# Patient Record
Sex: FEMALE | Race: WHITE | Employment: UNEMPLOYED | URBAN - NONMETROPOLITAN AREA
[De-identification: names, ages, dates, MRNs, and addresses within clinical notes are randomized per-mention and may not be internally consistent; named-entity substitution may affect disease eponyms.]

---

## 2017-02-27 ENCOUNTER — HOSPITAL ENCOUNTER (OUTPATIENT)
Dept: PAIN MANAGEMENT | Age: 42
Discharge: HOME | End: 2017-02-27
Payer: COMMERCIAL

## 2017-02-27 VITALS
SYSTOLIC BLOOD PRESSURE: 128 MMHG | DIASTOLIC BLOOD PRESSURE: 79 MMHG | BODY MASS INDEX: 28.32 KG/M2 | HEIGHT: 65 IN | WEIGHT: 170 LBS | RESPIRATION RATE: 16 BRPM | OXYGEN SATURATION: 95 % | TEMPERATURE: 97.4 F | HEART RATE: 86 BPM

## 2017-02-27 DIAGNOSIS — M54.16 LUMBAR RADICULOPATHY: Primary | ICD-10-CM

## 2017-02-27 DIAGNOSIS — M54.16 LUMBAR BACK PAIN WITH RADICULOPATHY AFFECTING LEFT LOWER EXTREMITY: ICD-10-CM

## 2017-02-27 PROCEDURE — G0463 HOSPITAL OUTPT CLINIC VISIT: HCPCS

## 2017-02-27 RX ORDER — MESALAMINE 375 MG/1
4 CAPSULE, EXTENDED RELEASE ORAL DAILY
Refills: 11 | COMMUNITY
Start: 2017-02-03 | End: 2017-07-26

## 2017-02-27 RX ORDER — SERTRALINE HYDROCHLORIDE 100 MG/1
150 TABLET, FILM COATED ORAL DAILY
Refills: 2 | COMMUNITY
Start: 2017-02-01

## 2017-02-27 RX ORDER — QUETIAPINE FUMARATE 50 MG/1
1 TABLET, FILM COATED ORAL NIGHTLY
Refills: 2 | COMMUNITY
Start: 2017-02-01

## 2017-02-27 RX ORDER — HYDROCHLOROTHIAZIDE 12.5 MG/1
1 TABLET ORAL DAILY
Refills: 2 | COMMUNITY
Start: 2017-02-01 | End: 2021-03-04

## 2017-02-27 RX ORDER — INSULIN GLARGINE 100 [IU]/ML
40 INJECTION, SOLUTION SUBCUTANEOUS NIGHTLY
Refills: 2 | COMMUNITY
Start: 2016-12-31 | End: 2017-04-20 | Stop reason: CLARIF

## 2017-02-27 RX ORDER — TRAMADOL HYDROCHLORIDE 50 MG/1
50 TABLET ORAL 3 TIMES DAILY PRN
Qty: 90 TABLET | Refills: 1 | Status: SHIPPED | OUTPATIENT
Start: 2017-02-27 | End: 2017-04-20 | Stop reason: SDUPTHER

## 2017-02-27 RX ORDER — GABAPENTIN 300 MG/1
1 CAPSULE ORAL 4 TIMES DAILY
Refills: 2 | COMMUNITY
Start: 2017-01-05

## 2017-02-27 RX ORDER — OLANZAPINE 5 MG/1
1 TABLET ORAL DAILY
Refills: 2 | COMMUNITY
Start: 2017-01-05

## 2017-02-27 RX ORDER — MONTELUKAST SODIUM 4 MG/1
2-3 TABLET, CHEWABLE ORAL NIGHTLY
Refills: 11 | COMMUNITY
Start: 2017-02-22 | End: 2017-07-26

## 2017-02-27 RX ORDER — LAMOTRIGINE 100 MG/1
100 TABLET ORAL DAILY
COMMUNITY

## 2017-02-27 RX ORDER — INSULIN ASPART 100 [IU]/ML
20 INJECTION, SOLUTION INTRAVENOUS; SUBCUTANEOUS 3 TIMES DAILY
Refills: 2 | COMMUNITY
Start: 2017-01-31

## 2017-02-27 ASSESSMENT — PAIN DESCRIPTION - LOCATION: LOCATION: BACK;LEG

## 2017-02-27 ASSESSMENT — PAIN DESCRIPTION - FREQUENCY: FREQUENCY: CONTINUOUS

## 2017-02-27 ASSESSMENT — PAIN DESCRIPTION - ONSET: ONSET: ON-GOING

## 2017-02-27 ASSESSMENT — PAIN DESCRIPTION - PAIN TYPE: TYPE: CHRONIC PAIN

## 2017-02-27 ASSESSMENT — ACTIVITIES OF DAILY LIVING (ADL): EFFECT OF PAIN ON DAILY ACTIVITIES: DAILY ACTIVITIES

## 2017-02-27 ASSESSMENT — PAIN DESCRIPTION - DESCRIPTORS: DESCRIPTORS: ACHING;CONSTANT;THROBBING;NUMBNESS;TINGLING

## 2017-02-27 ASSESSMENT — PAIN DESCRIPTION - ORIENTATION: ORIENTATION: LEFT;LOWER

## 2017-02-27 ASSESSMENT — PAIN SCALES - GENERAL: PAINLEVEL_OUTOF10: 7

## 2017-04-20 ENCOUNTER — HOSPITAL ENCOUNTER (OUTPATIENT)
Dept: PAIN MANAGEMENT | Age: 42
Discharge: HOME | End: 2017-04-20
Payer: COMMERCIAL

## 2017-04-20 VITALS
SYSTOLIC BLOOD PRESSURE: 114 MMHG | OXYGEN SATURATION: 92 % | BODY MASS INDEX: 27.12 KG/M2 | HEART RATE: 85 BPM | DIASTOLIC BLOOD PRESSURE: 71 MMHG | WEIGHT: 163 LBS | RESPIRATION RATE: 20 BRPM

## 2017-04-20 VITALS — HEART RATE: 79 BPM | OXYGEN SATURATION: 94 % | SYSTOLIC BLOOD PRESSURE: 120 MMHG | DIASTOLIC BLOOD PRESSURE: 79 MMHG

## 2017-04-20 DIAGNOSIS — M51.36 LUMBAR DEGENERATIVE DISC DISEASE: ICD-10-CM

## 2017-04-20 DIAGNOSIS — M51.16 LUMBAR DISC DISEASE WITH RADICULOPATHY: Chronic | ICD-10-CM

## 2017-04-20 PROCEDURE — 3209999900 FLUORO FOR SURGICAL PROCEDURES

## 2017-04-20 PROCEDURE — 2500000003 HC RX 250 WO HCPCS

## 2017-04-20 PROCEDURE — 62323 NJX INTERLAMINAR LMBR/SAC: CPT

## 2017-04-20 PROCEDURE — 6360000002 HC RX W HCPCS

## 2017-04-20 PROCEDURE — 2580000003 HC RX 258

## 2017-04-20 PROCEDURE — 80307 DRUG TEST PRSMV CHEM ANLYZR: CPT

## 2017-04-20 RX ORDER — TRAMADOL HYDROCHLORIDE 50 MG/1
TABLET ORAL
Qty: 45 TABLET | Refills: 1 | Status: SHIPPED | OUTPATIENT
Start: 2017-04-20 | End: 2017-06-14 | Stop reason: SDUPTHER

## 2017-04-20 RX ORDER — CLINDAMYCIN HYDROCHLORIDE 300 MG/1
300 CAPSULE ORAL 3 TIMES DAILY
COMMUNITY
Start: 2017-04-13 | End: 2018-02-12 | Stop reason: ALTCHOICE

## 2017-04-20 RX ORDER — 0.9 % SODIUM CHLORIDE 0.9 %
VIAL (ML) INJECTION
Status: COMPLETED | OUTPATIENT
Start: 2017-04-20 | End: 2017-04-20

## 2017-04-20 RX ORDER — LIDOCAINE HYDROCHLORIDE 10 MG/ML
INJECTION, SOLUTION EPIDURAL; INFILTRATION; INTRACAUDAL; PERINEURAL
Status: COMPLETED | OUTPATIENT
Start: 2017-04-20 | End: 2017-04-20

## 2017-04-20 RX ORDER — TRAMADOL HYDROCHLORIDE 50 MG/1
50 TABLET ORAL 3 TIMES DAILY PRN
Qty: 90 TABLET | Refills: 1 | Status: SHIPPED | OUTPATIENT
Start: 2017-04-28 | End: 2017-04-20 | Stop reason: SDUPTHER

## 2017-04-20 RX ORDER — BUPIVACAINE HYDROCHLORIDE 2.5 MG/ML
INJECTION, SOLUTION EPIDURAL; INFILTRATION; INTRACAUDAL
Status: COMPLETED | OUTPATIENT
Start: 2017-04-20 | End: 2017-04-20

## 2017-04-20 RX ORDER — ERGOCALCIFEROL 1.25 MG/1
50000 CAPSULE ORAL WEEKLY
COMMUNITY

## 2017-04-20 RX ORDER — QUETIAPINE FUMARATE 100 MG/1
100 TABLET, FILM COATED ORAL EVERY EVENING
Refills: 5 | COMMUNITY
Start: 2017-04-03

## 2017-04-20 RX ORDER — METHYLPREDNISOLONE ACETATE 80 MG/ML
INJECTION, SUSPENSION INTRA-ARTICULAR; INTRALESIONAL; INTRAMUSCULAR; SOFT TISSUE
Status: COMPLETED | OUTPATIENT
Start: 2017-04-20 | End: 2017-04-20

## 2017-04-20 RX ADMIN — BUPIVACAINE HYDROCHLORIDE 2.5 ML: 2.5 INJECTION, SOLUTION EPIDURAL; INFILTRATION; INTRACAUDAL at 14:01

## 2017-04-20 RX ADMIN — Medication 1.5 ML: at 14:01

## 2017-04-20 RX ADMIN — METHYLPREDNISOLONE ACETATE 80 MG: 80 INJECTION, SUSPENSION INTRA-ARTICULAR; INTRALESIONAL; INTRAMUSCULAR; SOFT TISSUE at 14:02

## 2017-04-20 RX ADMIN — LIDOCAINE HYDROCHLORIDE 3 ML: 10 INJECTION, SOLUTION EPIDURAL; INFILTRATION; INTRACAUDAL; PERINEURAL at 14:00

## 2017-04-20 ASSESSMENT — PAIN DESCRIPTION - DESCRIPTORS: DESCRIPTORS: CONSTANT;ACHING;THROBBING

## 2017-04-20 ASSESSMENT — PAIN - FUNCTIONAL ASSESSMENT: PAIN_FUNCTIONAL_ASSESSMENT: 0-10

## 2017-04-20 ASSESSMENT — PAIN DESCRIPTION - PAIN TYPE: TYPE: CHRONIC PAIN

## 2017-04-20 ASSESSMENT — PAIN DESCRIPTION - DIRECTION: RADIATING_TOWARDS: DOWN LEFT LEG

## 2017-04-20 ASSESSMENT — PAIN SCALES - GENERAL: PAINLEVEL_OUTOF10: 5

## 2017-04-20 ASSESSMENT — PAIN DESCRIPTION - ORIENTATION: ORIENTATION: LEFT

## 2017-04-20 ASSESSMENT — PAIN DESCRIPTION - LOCATION: LOCATION: BACK;LEG

## 2017-04-27 LAB
AMPHETAMINES, URINE: NEGATIVE NG/ML
BARBITURATES, URINE: NEGATIVE NG/ML
BENZODIAZEPINES, URINE: NEGATIVE NG/ML
CANNABINOIDS, URINE: NEGATIVE NG/ML
COCAINE METABOLITE, URINE: NEGATIVE NG/ML
CREATININE, URINE: 56.6 MG/DL (ref 20–300)
ETHANOL U, QUAN: NORMAL %
ETHANOL, UR CONF: >0.435 %
FENTANYL URINE: NEGATIVE PG/ML
MEPERIDINE, UR: NEGATIVE NG/ML
METHADONE SCREEN, URINE: NEGATIVE NG/ML
OPIATES, URINE: NEGATIVE NG/ML
OXYCODONE/OXYMORPHONE, UR: NEGATIVE NG/ML
PH, URINE: 5.6 (ref 4.5–8.9)
PHENCYCLIDINE, URINE: NEGATIVE NG/ML
PROPOXYPHENE, URINE: NEGATIVE NG/ML

## 2017-07-26 ENCOUNTER — HOSPITAL ENCOUNTER (OUTPATIENT)
Dept: PAIN MANAGEMENT | Age: 42
Discharge: HOME | End: 2017-07-26
Payer: COMMERCIAL

## 2017-07-26 ENCOUNTER — HOSPITAL ENCOUNTER (OUTPATIENT)
Dept: GENERAL RADIOLOGY | Age: 42
Discharge: HOME | End: 2017-07-26
Payer: COMMERCIAL

## 2017-07-26 VITALS
HEIGHT: 65 IN | OXYGEN SATURATION: 96 % | TEMPERATURE: 98.4 F | WEIGHT: 165 LBS | RESPIRATION RATE: 18 BRPM | DIASTOLIC BLOOD PRESSURE: 85 MMHG | SYSTOLIC BLOOD PRESSURE: 121 MMHG | HEART RATE: 102 BPM | BODY MASS INDEX: 27.49 KG/M2

## 2017-07-26 DIAGNOSIS — G89.29 CHRONIC PAIN OF LEFT KNEE: Primary | ICD-10-CM

## 2017-07-26 DIAGNOSIS — G89.29 CHRONIC PAIN OF LEFT KNEE: ICD-10-CM

## 2017-07-26 DIAGNOSIS — M25.562 CHRONIC PAIN OF LEFT KNEE: ICD-10-CM

## 2017-07-26 DIAGNOSIS — M51.16 LUMBAR DISC DISEASE WITH RADICULOPATHY: ICD-10-CM

## 2017-07-26 DIAGNOSIS — M25.562 CHRONIC PAIN OF LEFT KNEE: Primary | ICD-10-CM

## 2017-07-26 PROCEDURE — 73564 X-RAY EXAM KNEE 4 OR MORE: CPT

## 2017-07-26 PROCEDURE — 99213 OFFICE O/P EST LOW 20 MIN: CPT

## 2017-07-26 ASSESSMENT — PAIN DESCRIPTION - ORIENTATION: ORIENTATION: LOWER

## 2017-07-26 ASSESSMENT — PAIN DESCRIPTION - LOCATION: LOCATION: BACK

## 2017-07-26 ASSESSMENT — PAIN SCALES - GENERAL: PAINLEVEL_OUTOF10: 4

## 2017-07-26 ASSESSMENT — PAIN DESCRIPTION - DESCRIPTORS: DESCRIPTORS: ACHING;CONSTANT;THROBBING;NUMBNESS;TINGLING

## 2017-07-26 ASSESSMENT — PAIN DESCRIPTION - DIRECTION: RADIATING_TOWARDS: INTO LEFT HIP

## 2017-07-26 ASSESSMENT — PAIN DESCRIPTION - FREQUENCY: FREQUENCY: CONTINUOUS

## 2017-07-26 ASSESSMENT — PAIN DESCRIPTION - PROGRESSION: CLINICAL_PROGRESSION: NOT CHANGED

## 2017-07-26 ASSESSMENT — PAIN DESCRIPTION - ONSET: ONSET: ON-GOING

## 2017-07-26 ASSESSMENT — PAIN DESCRIPTION - PAIN TYPE: TYPE: CHRONIC PAIN

## 2017-07-26 ASSESSMENT — ACTIVITIES OF DAILY LIVING (ADL): EFFECT OF PAIN ON DAILY ACTIVITIES: LIMITS ACTIVITY

## 2017-08-28 ENCOUNTER — HOSPITAL ENCOUNTER (OUTPATIENT)
Dept: PAIN MANAGEMENT | Age: 42
Discharge: HOME | End: 2017-08-28
Payer: COMMERCIAL

## 2017-08-28 VITALS
RESPIRATION RATE: 18 BRPM | WEIGHT: 165 LBS | HEART RATE: 87 BPM | OXYGEN SATURATION: 96 % | SYSTOLIC BLOOD PRESSURE: 112 MMHG | TEMPERATURE: 97.7 F | BODY MASS INDEX: 27.49 KG/M2 | HEIGHT: 65 IN | DIASTOLIC BLOOD PRESSURE: 79 MMHG

## 2017-08-28 DIAGNOSIS — M51.16 LUMBAR DISC DISEASE WITH RADICULOPATHY: ICD-10-CM

## 2017-08-28 PROBLEM — M17.9 OSTEOARTHRITIS OF KNEE: Status: ACTIVE | Noted: 2017-08-28

## 2017-08-28 PROCEDURE — 6360000002 HC RX W HCPCS

## 2017-08-28 PROCEDURE — 20611 DRAIN/INJ JOINT/BURSA W/US: CPT

## 2017-08-28 PROCEDURE — 2500000003 HC RX 250 WO HCPCS

## 2017-08-28 RX ORDER — TRIAMCINOLONE ACETONIDE 40 MG/ML
INJECTION, SUSPENSION INTRA-ARTICULAR; INTRAMUSCULAR
Status: COMPLETED | OUTPATIENT
Start: 2017-08-28 | End: 2017-08-28

## 2017-08-28 RX ORDER — TRAMADOL HYDROCHLORIDE 50 MG/1
TABLET ORAL
Qty: 45 TABLET | Refills: 2 | Status: SHIPPED | OUTPATIENT
Start: 2017-09-17 | End: 2017-12-14 | Stop reason: SDUPTHER

## 2017-08-28 RX ORDER — BUPIVACAINE HYDROCHLORIDE 5 MG/ML
INJECTION, SOLUTION EPIDURAL; INTRACAUDAL
Status: COMPLETED | OUTPATIENT
Start: 2017-08-28 | End: 2017-08-28

## 2017-08-28 RX ADMIN — BUPIVACAINE HYDROCHLORIDE 9.5 ML: 5 INJECTION, SOLUTION EPIDURAL; INTRACAUDAL at 14:29

## 2017-08-28 RX ADMIN — TRIAMCINOLONE ACETONIDE 20 MG: 40 INJECTION, SUSPENSION INTRA-ARTICULAR; INTRAMUSCULAR at 14:30

## 2017-08-28 ASSESSMENT — ACTIVITIES OF DAILY LIVING (ADL): EFFECT OF PAIN ON DAILY ACTIVITIES: LIMITS ACTIVITY

## 2017-08-28 ASSESSMENT — PAIN - FUNCTIONAL ASSESSMENT: PAIN_FUNCTIONAL_ASSESSMENT: 0-10

## 2017-08-28 ASSESSMENT — PAIN DESCRIPTION - DESCRIPTORS: DESCRIPTORS: ACHING;CONSTANT;THROBBING;NUMBNESS;TINGLING

## 2017-09-28 ENCOUNTER — HOSPITAL ENCOUNTER (OUTPATIENT)
Dept: PAIN MANAGEMENT | Age: 42
Discharge: HOME | End: 2017-09-28
Payer: COMMERCIAL

## 2017-09-28 VITALS
HEART RATE: 90 BPM | HEIGHT: 65 IN | WEIGHT: 168 LBS | TEMPERATURE: 98.3 F | SYSTOLIC BLOOD PRESSURE: 123 MMHG | OXYGEN SATURATION: 92 % | BODY MASS INDEX: 27.99 KG/M2 | DIASTOLIC BLOOD PRESSURE: 77 MMHG | RESPIRATION RATE: 20 BRPM

## 2017-09-28 DIAGNOSIS — M51.36 LUMBAR DEGENERATIVE DISC DISEASE: ICD-10-CM

## 2017-09-28 DIAGNOSIS — M51.16 LUMBAR DISC DISEASE WITH RADICULOPATHY: ICD-10-CM

## 2017-09-28 PROCEDURE — 6360000002 HC RX W HCPCS

## 2017-09-28 PROCEDURE — 62323 NJX INTERLAMINAR LMBR/SAC: CPT

## 2017-09-28 PROCEDURE — 2580000003 HC RX 258

## 2017-09-28 PROCEDURE — 2500000003 HC RX 250 WO HCPCS

## 2017-09-28 PROCEDURE — 3209999900 FLUORO FOR SURGICAL PROCEDURES

## 2017-09-28 RX ORDER — BUPIVACAINE HYDROCHLORIDE 2.5 MG/ML
INJECTION, SOLUTION EPIDURAL; INFILTRATION; INTRACAUDAL
Status: COMPLETED | OUTPATIENT
Start: 2017-09-28 | End: 2017-09-28

## 2017-09-28 RX ORDER — LIDOCAINE HYDROCHLORIDE 10 MG/ML
INJECTION, SOLUTION EPIDURAL; INFILTRATION; INTRACAUDAL; PERINEURAL
Status: COMPLETED | OUTPATIENT
Start: 2017-09-28 | End: 2017-09-28

## 2017-09-28 RX ORDER — METHYLPREDNISOLONE ACETATE 80 MG/ML
INJECTION, SUSPENSION INTRA-ARTICULAR; INTRALESIONAL; INTRAMUSCULAR; SOFT TISSUE
Status: COMPLETED | OUTPATIENT
Start: 2017-09-28 | End: 2017-09-28

## 2017-09-28 RX ORDER — 0.9 % SODIUM CHLORIDE 0.9 %
VIAL (ML) INJECTION
Status: COMPLETED | OUTPATIENT
Start: 2017-09-28 | End: 2017-09-28

## 2017-09-28 RX ADMIN — LIDOCAINE HYDROCHLORIDE 3 ML: 10 INJECTION, SOLUTION EPIDURAL; INFILTRATION; INTRACAUDAL; PERINEURAL at 14:15

## 2017-09-28 RX ADMIN — METHYLPREDNISOLONE ACETATE 80 MG: 80 INJECTION, SUSPENSION INTRA-ARTICULAR; INTRALESIONAL; INTRAMUSCULAR; SOFT TISSUE at 14:18

## 2017-09-28 RX ADMIN — Medication 1.5 ML: at 14:17

## 2017-09-28 RX ADMIN — BUPIVACAINE HYDROCHLORIDE 2.5 ML: 2.5 INJECTION, SOLUTION EPIDURAL; INFILTRATION; INTRACAUDAL at 14:17

## 2017-09-28 ASSESSMENT — ACTIVITIES OF DAILY LIVING (ADL): EFFECT OF PAIN ON DAILY ACTIVITIES: DAILY CHORES AND ACTIVITIES

## 2017-09-28 ASSESSMENT — PAIN - FUNCTIONAL ASSESSMENT: PAIN_FUNCTIONAL_ASSESSMENT: 0-10

## 2017-10-18 RX ORDER — IBUPROFEN AND FAMOTIDINE 26.6; 8 MG/1; MG/1
TABLET, FILM COATED ORAL
Qty: 90 TABLET | Refills: 2 | Status: SHIPPED | OUTPATIENT
Start: 2017-10-18 | End: 2018-01-23 | Stop reason: SDUPTHER

## 2017-11-13 ENCOUNTER — HOSPITAL ENCOUNTER (OUTPATIENT)
Dept: PAIN MANAGEMENT | Age: 42
Discharge: HOME | End: 2017-11-13
Payer: COMMERCIAL

## 2017-11-13 VITALS
HEIGHT: 64 IN | BODY MASS INDEX: 28.68 KG/M2 | WEIGHT: 168 LBS | TEMPERATURE: 97.6 F | OXYGEN SATURATION: 97 % | SYSTOLIC BLOOD PRESSURE: 129 MMHG | DIASTOLIC BLOOD PRESSURE: 89 MMHG | HEART RATE: 85 BPM | RESPIRATION RATE: 18 BRPM

## 2017-11-13 DIAGNOSIS — M51.16 LUMBAR DISC DISEASE WITH RADICULOPATHY: ICD-10-CM

## 2017-11-13 PROCEDURE — 99213 OFFICE O/P EST LOW 20 MIN: CPT

## 2017-11-13 ASSESSMENT — PAIN DESCRIPTION - ONSET: ONSET: ON-GOING

## 2017-11-13 ASSESSMENT — PAIN DESCRIPTION - PROGRESSION: CLINICAL_PROGRESSION: NOT CHANGED

## 2017-11-13 ASSESSMENT — PAIN DESCRIPTION - PAIN TYPE: TYPE: CHRONIC PAIN

## 2017-11-13 ASSESSMENT — PAIN DESCRIPTION - FREQUENCY: FREQUENCY: CONTINUOUS

## 2017-11-13 ASSESSMENT — ACTIVITIES OF DAILY LIVING (ADL): EFFECT OF PAIN ON DAILY ACTIVITIES: LIMITS ACTIVITY

## 2017-11-13 ASSESSMENT — PAIN DESCRIPTION - DIRECTION: RADIATING_TOWARDS: INTO LEFT LEG

## 2017-11-13 ASSESSMENT — PAIN DESCRIPTION - LOCATION: LOCATION: BACK

## 2017-11-13 ASSESSMENT — PAIN DESCRIPTION - ORIENTATION: ORIENTATION: LOWER

## 2017-11-13 ASSESSMENT — PAIN DESCRIPTION - DESCRIPTORS: DESCRIPTORS: ACHING;CONSTANT;THROBBING

## 2017-11-13 ASSESSMENT — PAIN SCALES - GENERAL: PAINLEVEL_OUTOF10: 2

## 2017-11-13 NOTE — PROGRESS NOTES
Sandie Lemons/Gwendolyn  Patient Pain Assessment    Primary / Referring Physician: Moisés Moraes MD    Chief complaint:   Chief Complaint   Patient presents with    Lower Back Pain     radiates into left leg       History of Present Illness -   Windy Lesch is a 40 y.o. female that presents to the office for follow-up of MACHELLE. She says that she obtained 80% relief for 2 months. Current Pain Assessment  Pain Assessment  Pain Assessment: 0-10  Pain Level: 2  Pain Type: Chronic pain  Pain Location: Back  Pain Orientation: Lower  Pain Radiating Towards: into left leg  Pain Descriptors: Aching, Constant, Throbbing  Pain Frequency: Continuous  Pain Onset: On-going  Clinical Progression: Not changed  Effect of Pain on Daily Activities: limits activity  Patient's Stated Pain Goal: No pain  Pain Intervention(s): Medication (see eMar), Repositioned, Rest  Response to Pain Intervention: Patient Satisfied  Multiple Pain Sites: No  POSS Score (Patient Ctrl Analgesia): 1      Pain medication effectiveness:   Reports that pain medication helps to increase activities of daily living    Issues of concern (physical, mental, social) or changes in condition since last visit per patient report: none    BMI: Body mass index is 28.84 kg/m². Activity: discussed exercise as beneficial to pain reduction, encouraged stretching exercise with a focus on torso strengthening. Acute Bowel or Bladder Changes: no    Side Effects of Medication: no    Social History  Social History     Social History    Marital status:      Spouse name: N/A    Number of children: N/A    Years of education: N/A     Social History Main Topics    Smoking status: Current Every Day Smoker     Packs/day: 1.00     Types: Cigarettes    Smokeless tobacco: None    Alcohol use No    Drug use: No    Sexual activity: Not Asked     Other Topics Concern    None     Social History Narrative    None      reports that she does not use drugs.   History current facility-administered medications for this encounter. Allergies  Biaxin [clarithromycin]; Levaquin [levofloxacin in d5w]; and Metformin and related    Family History  family history is not on file. Objective Information:  Vitals: /89   Pulse 85   Temp 97.6 °F (36.4 °C) (Oral)   Resp 18   Ht 5' 4\" (1.626 m)   Wt 168 lb (76.2 kg)   SpO2 97%   BMI 28.84 kg/m² , Body mass index is 28.84 kg/m². Physical Exam  General appearance: no acute distress  Head: NCAT, EOMI  SKIN: Warm, Dry   Musculoskeletal: ambulatory per self, steady gait  Positive straight leg left  Neurologic: alert and oriented X 3, speech clear  Mood and affect: appropriate, no SI or HI     UDS:  Lab Results   Component Value Date    BARBITURATES Negative 04/20/2017    BENZODIAZURI Negative 04/20/2017    OPIAU Negative 04/20/2017    OXYCOOXYMO Negative 04/20/2017    PHENCYCU Negative 04/20/2017    LABMETH Negative 04/20/2017    PPXUR Negative 04/20/2017    MEPERIDU Negative 04/20/2017    FENTU Negative 04/20/2017    ETHUQN SEE BELOW 04/20/2017    CANNANBINURI Negative 04/20/2017    AMPHETUR Negative 04/20/2017    COCAINEMETUR Negative 04/20/2017       Physical therapy during the last 6 months: no  Injections for pain control discussed: yes    ASSESSMENT  Patient Active Problem List   Diagnosis    Lumbar disc disease with radiculopathy    Osteoarthritis of knee    Lumbar disc disease with radiculopathy        PLAN  1. Patient is to call with any questions or concerns which may arise prior to the next office visit   2. Continue current dosage of Tramadol per TONE fill date   3.   Schedule patient for LESI level L4-L5 no sedation    Discussion:    Education Provided:  [x] Review of Tanmay Taylor [x] Agreement Review [] Compliance Issues Discussed   [] Cognitive Behavior Needs [x] Exercise [] Review of Test [] Financial Issues  [] Tobacco/Alcohol Use [x] Teaching [] New Patient [] Picture Obtained    Controlled Substance

## 2017-11-13 NOTE — PROGRESS NOTES
Nursing Admission Record    Current Issues / Falls / ER Visits:  No    Percentage of Pain Relief after Last Procedure:  80 %    How long lasted:  2 months    Radiology exams received during the last 12 months: Yes       When 2017                                              Where [address] on chart: Yes         Imaging records requested: No  MRI exams received in the past 2 years:  Yes  Physical therapy during the last 6 months: No       When: na                                             Where  na  Labs during the last 12 months: Yes    Education Provided:  [x] Review of Elgin Presto  [x] Agreement Review  [x] Compliance Issues Discussed    [] Cognitive Behavior Needs [x] Exercise [] Review of Test [] Financial Issues  [] Tobacco/Alcohol Use [x] Teaching [] New Patient [] Picture Obtained    Physician Plan:  [] Outgoing Referral  [] Pharmacy Consult  [] Test Ordered   [] Obtained Test Results / Consult Notes  [] UDS due at next visit, verified per EPIC      [] Suspected Physical Abuse or Suicide Risk assessed - IF YES COMPLETE QUESTIONS BELOW    If any of the following questions are answered yes - contact attending physician for referral:    Has been considering harming self to escape stress, pain problems? [] YES  [x] NO  Has a suicide plan? [] YES  [x] NO  Has attempted suicide in the past?   [] YES  [x] NO  Has a close friend or family member who committed suicide? [] YES  [x] NO    Patient Referred To :      Additional Notes:    Assessment Completed by:  Electronically signed by Dejon Balderrama RN on 11/13/2017 at 2:03 PM

## 2017-12-18 RX ORDER — TRAMADOL HYDROCHLORIDE 50 MG/1
TABLET ORAL
Qty: 45 TABLET | Refills: 2 | Status: SHIPPED | OUTPATIENT
Start: 2017-12-18 | End: 2018-03-19 | Stop reason: SDUPTHER

## 2018-01-23 RX ORDER — IBUPROFEN AND FAMOTIDINE 26.6; 8 MG/1; MG/1
1 TABLET, FILM COATED ORAL 3 TIMES DAILY
Qty: 90 TABLET | Refills: 2 | Status: SHIPPED | OUTPATIENT
Start: 2018-01-23 | End: 2018-04-25 | Stop reason: SDUPTHER

## 2018-02-12 ENCOUNTER — HOSPITAL ENCOUNTER (OUTPATIENT)
Dept: PAIN MANAGEMENT | Age: 43
Discharge: HOME | End: 2018-02-12
Payer: COMMERCIAL

## 2018-02-12 VITALS
HEIGHT: 64 IN | OXYGEN SATURATION: 93 % | TEMPERATURE: 98.1 F | SYSTOLIC BLOOD PRESSURE: 117 MMHG | DIASTOLIC BLOOD PRESSURE: 76 MMHG | RESPIRATION RATE: 20 BRPM | BODY MASS INDEX: 29.37 KG/M2 | HEART RATE: 97 BPM | WEIGHT: 172 LBS

## 2018-02-12 DIAGNOSIS — M51.16 LUMBAR DISC DISEASE WITH RADICULOPATHY: ICD-10-CM

## 2018-02-12 DIAGNOSIS — M51.36 LUMBAR DEGENERATIVE DISC DISEASE: ICD-10-CM

## 2018-02-12 PROCEDURE — 62323 NJX INTERLAMINAR LMBR/SAC: CPT

## 2018-02-12 PROCEDURE — 6360000002 HC RX W HCPCS

## 2018-02-12 PROCEDURE — 2500000003 HC RX 250 WO HCPCS

## 2018-02-12 PROCEDURE — 2580000003 HC RX 258

## 2018-02-12 PROCEDURE — 3209999900 FLUORO FOR SURGICAL PROCEDURES

## 2018-02-12 PROCEDURE — 80307 DRUG TEST PRSMV CHEM ANLYZR: CPT

## 2018-02-12 RX ORDER — BUPIVACAINE HYDROCHLORIDE 2.5 MG/ML
INJECTION, SOLUTION EPIDURAL; INFILTRATION; INTRACAUDAL
Status: COMPLETED | OUTPATIENT
Start: 2018-02-12 | End: 2018-02-12

## 2018-02-12 RX ORDER — LIDOCAINE HYDROCHLORIDE 10 MG/ML
INJECTION, SOLUTION EPIDURAL; INFILTRATION; INTRACAUDAL; PERINEURAL
Status: COMPLETED | OUTPATIENT
Start: 2018-02-12 | End: 2018-02-12

## 2018-02-12 RX ORDER — METHYLPREDNISOLONE ACETATE 80 MG/ML
INJECTION, SUSPENSION INTRA-ARTICULAR; INTRALESIONAL; INTRAMUSCULAR; SOFT TISSUE
Status: COMPLETED | OUTPATIENT
Start: 2018-02-12 | End: 2018-02-12

## 2018-02-12 RX ORDER — 0.9 % SODIUM CHLORIDE 0.9 %
VIAL (ML) INJECTION
Status: COMPLETED | OUTPATIENT
Start: 2018-02-12 | End: 2018-02-12

## 2018-02-12 RX ADMIN — Medication 1.5 ML: at 14:07

## 2018-02-12 RX ADMIN — BUPIVACAINE HYDROCHLORIDE 2.5 ML: 2.5 INJECTION, SOLUTION EPIDURAL; INFILTRATION; INTRACAUDAL at 14:07

## 2018-02-12 RX ADMIN — METHYLPREDNISOLONE ACETATE 80 MG: 80 INJECTION, SUSPENSION INTRA-ARTICULAR; INTRALESIONAL; INTRAMUSCULAR; SOFT TISSUE at 14:07

## 2018-02-12 RX ADMIN — LIDOCAINE HYDROCHLORIDE 3 ML: 10 INJECTION, SOLUTION EPIDURAL; INFILTRATION; INTRACAUDAL; PERINEURAL at 14:05

## 2018-02-12 ASSESSMENT — PAIN DESCRIPTION - DESCRIPTORS: DESCRIPTORS: ACHING;CONSTANT;THROBBING

## 2018-02-12 ASSESSMENT — PAIN - FUNCTIONAL ASSESSMENT: PAIN_FUNCTIONAL_ASSESSMENT: 0-10

## 2018-02-12 NOTE — PROCEDURES
DATE: 2/12/2018      REASON FOR VISIT: Principal Problem:    Lumbar disc disease with radiculopathy  Resolved Problems:    * No resolved hospital problems. *    Procedure:  Level of Consciousness: [x]Alert [x]Oriented []Disoriented []Lethargic  Anxiety Level: [x]Calm []Anxious []Depressed []Other  Skin: [x]Warm [x]Dry []Cool []Moist []Intact []Other  Cardiovascular: []Palpitations: [x]Never []Occasionally []Frequently  Chest Pain: [x]No []Yes  Respiratory:  [x]Unlabored []Labored []Cough ([] Productive []Unproductive)  HCG Required: []No [x]Yes   Results: [x]Negative []Positive  Knowledge Level:        [x]Patient/Other verbalized understanding of pre-procedure instructions. [x]Assessment of post-op care needs (transportation, responsible caregiver)        [x]Able to discuss health care problems and how to deal with it.   Factors that Affect Teaching:        Language Barrier: [x]No []Yes - why:        Hearing Loss:        [x]No []Yes            Corrective Device:  []Yes []No        Vision Loss:           []No [x]Yes            Corrective Device:  [x]Yes []No        Memory Loss:       [x]No []Yes            []Short Term []Long Term  Motivational Level:  [x]Asks Questions                  []Extremely Anxious       [x]Seems Interested               []Seems Uninterested                  [x]Denies need for Education  Risk for Injury:  [x]Patient oriented to person, place and time  []History of frequent falls/loss of balance  Nutritional:  []Change in appetite   []Weight Gain   []Weight Loss  Functional:  []Requires assistance with ADL'sNursing Admission Record     Current Issues / Falls / ER Visits:  No     Percentage of Pain Relief after Last Procedure:  80 %    How long lasted:  3 months     Radiology exams received during the last 12 months: Yes       When Left knee at eric around August                                                    Imaging on chart: Yes         Imaging records requested: No  MRI exams

## 2018-02-18 LAB
AMPHETAMINES, URINE: NEGATIVE NG/ML
BARBITURATES, URINE: NEGATIVE NG/ML
BENZODIAZEPINES, URINE: NEGATIVE NG/ML
CANNABINOIDS, URINE: NEGATIVE NG/ML
COCAINE METABOLITE, URINE: NEGATIVE NG/ML
CREATININE, URINE: 41.7 MG/DL (ref 20–300)
ETHANOL U, QUAN: ABNORMAL %
ETHANOL, UR CONF: 0.34 %
FENTANYL URINE: NEGATIVE PG/ML
MEPERIDINE, UR: NEGATIVE NG/ML
METHADONE SCREEN, URINE: NEGATIVE NG/ML
OPIATES, URINE: NEGATIVE NG/ML
OXYCODONE URINE: POSITIVE
OXYCODONE, UR GC/MS: 255 NG/ML
OXYCODONE/OXYMORPH, UR: POSITIVE
OXYCODONE/OXYMORPHONE, UR: ABNORMAL NG/ML
OXYMORPHONE, UR GC/MS: 345 NG/ML
OXYMORPHONE, URINE: POSITIVE
PH, URINE: 5.4 (ref 4.5–8.9)
PHENCYCLIDINE, URINE: NEGATIVE NG/ML
PROPOXYPHENE, URINE: NEGATIVE NG/ML

## 2018-03-20 RX ORDER — TRAMADOL HYDROCHLORIDE 50 MG/1
TABLET ORAL
Qty: 45 TABLET | Refills: 2 | Status: SHIPPED | OUTPATIENT
Start: 2018-03-20 | End: 2018-05-11 | Stop reason: SDUPTHER

## 2018-04-26 RX ORDER — IBUPROFEN AND FAMOTIDINE 26.6; 8 MG/1; MG/1
1 TABLET, FILM COATED ORAL 3 TIMES DAILY
Qty: 90 TABLET | Refills: 2 | Status: SHIPPED | OUTPATIENT
Start: 2018-04-26

## 2018-05-11 ENCOUNTER — HOSPITAL ENCOUNTER (OUTPATIENT)
Dept: PAIN MANAGEMENT | Age: 43
Discharge: HOME | End: 2018-05-11
Payer: COMMERCIAL

## 2018-05-11 VITALS
DIASTOLIC BLOOD PRESSURE: 68 MMHG | WEIGHT: 173 LBS | OXYGEN SATURATION: 95 % | HEART RATE: 93 BPM | TEMPERATURE: 96.8 F | SYSTOLIC BLOOD PRESSURE: 115 MMHG | HEIGHT: 65 IN | BODY MASS INDEX: 28.82 KG/M2

## 2018-05-11 PROCEDURE — 99212 OFFICE O/P EST SF 10 MIN: CPT

## 2018-05-11 ASSESSMENT — PAIN DESCRIPTION - PAIN TYPE: TYPE: CHRONIC PAIN

## 2018-05-11 ASSESSMENT — PAIN DESCRIPTION - LOCATION: LOCATION: BACK

## 2018-05-11 ASSESSMENT — ACTIVITIES OF DAILY LIVING (ADL): EFFECT OF PAIN ON DAILY ACTIVITIES: DAILY CHORES AND ACTIVITIES

## 2018-05-11 ASSESSMENT — PAIN DESCRIPTION - PROGRESSION: CLINICAL_PROGRESSION: GRADUALLY WORSENING

## 2018-05-11 ASSESSMENT — PAIN DESCRIPTION - FREQUENCY: FREQUENCY: INTERMITTENT

## 2018-05-11 ASSESSMENT — PAIN DESCRIPTION - DESCRIPTORS: DESCRIPTORS: CONSTANT;ACHING;THROBBING

## 2018-05-11 ASSESSMENT — PAIN SCALES - GENERAL: PAINLEVEL_OUTOF10: 7

## 2020-07-13 ENCOUNTER — TRANSCRIBE ORDERS (OUTPATIENT)
Dept: ADMINISTRATIVE | Facility: HOSPITAL | Age: 45
End: 2020-07-13

## 2020-07-13 DIAGNOSIS — Z01.818 PREOP TESTING: Primary | ICD-10-CM

## 2021-03-04 ENCOUNTER — HOSPITAL ENCOUNTER (EMERGENCY)
Age: 46
Discharge: HOME | End: 2021-03-04
Payer: MEDICAID

## 2021-03-04 VITALS
DIASTOLIC BLOOD PRESSURE: 88 MMHG | SYSTOLIC BLOOD PRESSURE: 139 MMHG | BODY MASS INDEX: 30.16 KG/M2 | TEMPERATURE: 98.1 F | OXYGEN SATURATION: 97 % | HEART RATE: 78 BPM | RESPIRATION RATE: 21 BRPM | WEIGHT: 181 LBS | HEIGHT: 65 IN

## 2021-03-04 DIAGNOSIS — M54.31 SCIATICA OF RIGHT SIDE: Primary | ICD-10-CM

## 2021-03-04 PROCEDURE — 6370000000 HC RX 637 (ALT 250 FOR IP): Performed by: PHYSICIAN ASSISTANT

## 2021-03-04 PROCEDURE — 96372 THER/PROPH/DIAG INJ SC/IM: CPT

## 2021-03-04 PROCEDURE — 99284 EMERGENCY DEPT VISIT MOD MDM: CPT

## 2021-03-04 PROCEDURE — 6360000002 HC RX W HCPCS: Performed by: PHYSICIAN ASSISTANT

## 2021-03-04 RX ORDER — ORPHENADRINE CITRATE 30 MG/ML
60 INJECTION INTRAMUSCULAR; INTRAVENOUS ONCE
Status: COMPLETED | OUTPATIENT
Start: 2021-03-04 | End: 2021-03-04

## 2021-03-04 RX ORDER — ONDANSETRON 4 MG/1
4 TABLET, ORALLY DISINTEGRATING ORAL ONCE
Status: COMPLETED | OUTPATIENT
Start: 2021-03-04 | End: 2021-03-04

## 2021-03-04 RX ORDER — HYDROMORPHONE HYDROCHLORIDE 1 MG/ML
0.5 INJECTION, SOLUTION INTRAMUSCULAR; INTRAVENOUS; SUBCUTANEOUS ONCE
Status: COMPLETED | OUTPATIENT
Start: 2021-03-04 | End: 2021-03-04

## 2021-03-04 RX ADMIN — ONDANSETRON 4 MG: 4 TABLET, ORALLY DISINTEGRATING ORAL at 11:13

## 2021-03-04 RX ADMIN — ORPHENADRINE CITRATE 60 MG: 60 INJECTION INTRAMUSCULAR; INTRAVENOUS at 11:13

## 2021-03-04 RX ADMIN — HYDROMORPHONE HYDROCHLORIDE 0.5 MG: 1 INJECTION, SOLUTION INTRAMUSCULAR; INTRAVENOUS; SUBCUTANEOUS at 11:13

## 2021-03-04 ASSESSMENT — ENCOUNTER SYMPTOMS
APNEA: 0
PHOTOPHOBIA: 0
SORE THROAT: 0
COUGH: 0
COLOR CHANGE: 0
ABDOMINAL PAIN: 0
ABDOMINAL DISTENTION: 0
EYE DISCHARGE: 0
NAUSEA: 0
SHORTNESS OF BREATH: 0
RHINORRHEA: 0
BACK PAIN: 1
EYE PAIN: 0

## 2021-03-04 ASSESSMENT — PAIN DESCRIPTION - LOCATION: LOCATION: BACK

## 2021-03-04 ASSESSMENT — PAIN SCALES - GENERAL: PAINLEVEL_OUTOF10: 10
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
## 2021-04-06 ENCOUNTER — TELEPHONE (OUTPATIENT)
Dept: NEUROSURGERY | Age: 46
End: 2021-04-06

## 2021-04-12 ENCOUNTER — TELEPHONE (OUTPATIENT)
Dept: NEUROSURGERY | Age: 46
End: 2021-04-12

## 2021-04-15 ENCOUNTER — TELEPHONE (OUTPATIENT)
Dept: NEUROSURGERY | Age: 46
End: 2021-04-15

## 2021-06-21 ENCOUNTER — APPOINTMENT (OUTPATIENT)
Dept: GENERAL RADIOLOGY | Facility: HOSPITAL | Age: 46
End: 2021-06-21

## 2021-06-21 ENCOUNTER — HOSPITAL ENCOUNTER (INPATIENT)
Facility: HOSPITAL | Age: 46
LOS: 11 days | Discharge: NURSING, REHAB OR LONG-TERM CARE | End: 2021-07-02
Attending: FAMILY MEDICINE | Admitting: SURGERY

## 2021-06-21 DIAGNOSIS — Z74.09 IMPAIRED MOBILITY: ICD-10-CM

## 2021-06-21 DIAGNOSIS — Z51.5 END OF LIFE CARE: ICD-10-CM

## 2021-06-21 DIAGNOSIS — R13.10 DYSPHAGIA, UNSPECIFIED TYPE: Primary | ICD-10-CM

## 2021-06-21 PROBLEM — I38 ENDOCARDITIS: Status: ACTIVE | Noted: 2021-06-21

## 2021-06-21 PROBLEM — Z87.898 HISTORY OF BACTEREMIA: Status: ACTIVE | Noted: 2021-06-21

## 2021-06-21 PROBLEM — E10.9 DIABETES MELLITUS TYPE 1 (HCC): Status: ACTIVE | Noted: 2021-06-21

## 2021-06-21 PROBLEM — I33.0 AORTIC VALVE VEGETATION: Status: ACTIVE | Noted: 2021-06-21

## 2021-06-21 PROBLEM — R52 GENERALIZED PAIN: Status: ACTIVE | Noted: 2021-06-21

## 2021-06-21 PROBLEM — K59.00 CONSTIPATION: Status: ACTIVE | Noted: 2021-06-21

## 2021-06-21 PROBLEM — I74.9 ARTERIAL THROMBOSIS (HCC): Status: ACTIVE | Noted: 2021-06-21

## 2021-06-21 LAB
ALBUMIN SERPL-MCNC: 2 G/DL (ref 3.5–5.2)
ALBUMIN/GLOB SERPL: 0.5 G/DL
ALP SERPL-CCNC: 453 U/L (ref 39–117)
ALT SERPL W P-5'-P-CCNC: 33 U/L (ref 1–33)
ANION GAP SERPL CALCULATED.3IONS-SCNC: 6 MMOL/L (ref 5–15)
APTT PPP: 41.3 SECONDS (ref 24.1–35)
AST SERPL-CCNC: 38 U/L (ref 1–32)
BASOPHILS # BLD AUTO: 0.06 10*3/MM3 (ref 0–0.2)
BASOPHILS NFR BLD AUTO: 0.6 % (ref 0–1.5)
BILIRUB SERPL-MCNC: 0.4 MG/DL (ref 0–1.2)
BUN SERPL-MCNC: 6 MG/DL (ref 6–20)
BUN/CREAT SERPL: 7.7 (ref 7–25)
CALCIUM SPEC-SCNC: 10.7 MG/DL (ref 8.6–10.5)
CHLORIDE SERPL-SCNC: 103 MMOL/L (ref 98–107)
CO2 SERPL-SCNC: 26 MMOL/L (ref 22–29)
CREAT SERPL-MCNC: 0.78 MG/DL (ref 0.57–1)
CRP SERPL-MCNC: 7.98 MG/DL (ref 0–0.5)
DEPRECATED RDW RBC AUTO: 53.1 FL (ref 37–54)
EOSINOPHIL # BLD AUTO: 0 10*3/MM3 (ref 0–0.4)
EOSINOPHIL NFR BLD AUTO: 0 % (ref 0.3–6.2)
ERYTHROCYTE [DISTWIDTH] IN BLOOD BY AUTOMATED COUNT: 17.3 % (ref 12.3–15.4)
ERYTHROCYTE [SEDIMENTATION RATE] IN BLOOD: 48 MM/HR (ref 0–20)
GFR SERPL CREATININE-BSD FRML MDRD: 80 ML/MIN/1.73
GLOBULIN UR ELPH-MCNC: 3.7 GM/DL
GLUCOSE BLDC GLUCOMTR-MCNC: 159 MG/DL (ref 70–130)
GLUCOSE SERPL-MCNC: 171 MG/DL (ref 65–99)
HCT VFR BLD AUTO: 31.8 % (ref 34–46.6)
HGB BLD-MCNC: 9.8 G/DL (ref 12–15.9)
IMM GRANULOCYTES # BLD AUTO: 0.06 10*3/MM3 (ref 0–0.05)
IMM GRANULOCYTES NFR BLD AUTO: 0.6 % (ref 0–0.5)
INR PPP: 1.21 (ref 0.91–1.09)
LYMPHOCYTES # BLD AUTO: 2.25 10*3/MM3 (ref 0.7–3.1)
LYMPHOCYTES NFR BLD AUTO: 20.8 % (ref 19.6–45.3)
MAGNESIUM SERPL-MCNC: 1.5 MG/DL (ref 1.6–2.6)
MCH RBC QN AUTO: 25.8 PG (ref 26.6–33)
MCHC RBC AUTO-ENTMCNC: 30.8 G/DL (ref 31.5–35.7)
MCV RBC AUTO: 83.7 FL (ref 79–97)
MONOCYTES # BLD AUTO: 1.04 10*3/MM3 (ref 0.1–0.9)
MONOCYTES NFR BLD AUTO: 9.6 % (ref 5–12)
NEUTROPHILS NFR BLD AUTO: 68.4 % (ref 42.7–76)
NEUTROPHILS NFR BLD AUTO: 7.41 10*3/MM3 (ref 1.7–7)
NRBC BLD AUTO-RTO: 0 /100 WBC (ref 0–0.2)
PLATELET # BLD AUTO: 553 10*3/MM3 (ref 140–450)
PMV BLD AUTO: 9.2 FL (ref 6–12)
POTASSIUM SERPL-SCNC: 3.9 MMOL/L (ref 3.5–5.2)
PROT SERPL-MCNC: 5.7 G/DL (ref 6–8.5)
PROTHROMBIN TIME: 14.4 SECONDS (ref 11.5–13.4)
RBC # BLD AUTO: 3.8 10*6/MM3 (ref 3.77–5.28)
SODIUM SERPL-SCNC: 135 MMOL/L (ref 136–145)
WBC # BLD AUTO: 10.82 10*3/MM3 (ref 3.4–10.8)

## 2021-06-21 PROCEDURE — 63710000001 INSULIN LISPRO (HUMAN) PER 5 UNITS: Performed by: NURSE PRACTITIONER

## 2021-06-21 PROCEDURE — 87040 BLOOD CULTURE FOR BACTERIA: CPT | Performed by: NURSE PRACTITIONER

## 2021-06-21 PROCEDURE — 74018 RADEX ABDOMEN 1 VIEW: CPT

## 2021-06-21 PROCEDURE — 94799 UNLISTED PULMONARY SVC/PX: CPT

## 2021-06-21 PROCEDURE — 85025 COMPLETE CBC W/AUTO DIFF WBC: CPT | Performed by: NURSE PRACTITIONER

## 2021-06-21 PROCEDURE — 71045 X-RAY EXAM CHEST 1 VIEW: CPT

## 2021-06-21 PROCEDURE — 85730 THROMBOPLASTIN TIME PARTIAL: CPT | Performed by: NURSE PRACTITIONER

## 2021-06-21 PROCEDURE — 86140 C-REACTIVE PROTEIN: CPT | Performed by: NURSE PRACTITIONER

## 2021-06-21 PROCEDURE — 94640 AIRWAY INHALATION TREATMENT: CPT

## 2021-06-21 PROCEDURE — 25010000002 VANCOMYCIN 10 G RECONSTITUTED SOLUTION: Performed by: INTERNAL MEDICINE

## 2021-06-21 PROCEDURE — 85651 RBC SED RATE NONAUTOMATED: CPT | Performed by: NURSE PRACTITIONER

## 2021-06-21 PROCEDURE — 83735 ASSAY OF MAGNESIUM: CPT | Performed by: NURSE PRACTITIONER

## 2021-06-21 PROCEDURE — 80053 COMPREHEN METABOLIC PANEL: CPT | Performed by: NURSE PRACTITIONER

## 2021-06-21 PROCEDURE — 85610 PROTHROMBIN TIME: CPT | Performed by: NURSE PRACTITIONER

## 2021-06-21 PROCEDURE — 25010000002 MAGNESIUM SULFATE 2 GM/50ML SOLUTION: Performed by: NURSE PRACTITIONER

## 2021-06-21 PROCEDURE — 82962 GLUCOSE BLOOD TEST: CPT

## 2021-06-21 RX ORDER — ACETAMINOPHEN 160 MG/5ML
650 SOLUTION ORAL EVERY 4 HOURS PRN
Status: DISCONTINUED | OUTPATIENT
Start: 2021-06-21 | End: 2021-06-28 | Stop reason: SDUPTHER

## 2021-06-21 RX ORDER — SIMVASTATIN 40 MG
40 TABLET ORAL NIGHTLY
Status: ON HOLD | COMMUNITY
End: 2021-06-28

## 2021-06-21 RX ORDER — DOCUSATE SODIUM 100 MG/1
100 CAPSULE, LIQUID FILLED ORAL DAILY
Status: ON HOLD | COMMUNITY
End: 2021-06-28

## 2021-06-21 RX ORDER — TRAMADOL HYDROCHLORIDE 50 MG/1
50 TABLET ORAL EVERY 8 HOURS PRN
Status: DISCONTINUED | OUTPATIENT
Start: 2021-06-21 | End: 2021-06-28 | Stop reason: SDUPTHER

## 2021-06-21 RX ORDER — SODIUM CHLORIDE, SODIUM LACTATE, POTASSIUM CHLORIDE, CALCIUM CHLORIDE 600; 310; 30; 20 MG/100ML; MG/100ML; MG/100ML; MG/100ML
50 INJECTION, SOLUTION INTRAVENOUS CONTINUOUS
Status: DISCONTINUED | OUTPATIENT
Start: 2021-06-21 | End: 2021-06-22

## 2021-06-21 RX ORDER — GABAPENTIN 100 MG/1
100 CAPSULE ORAL 3 TIMES DAILY
COMMUNITY
End: 2021-07-02 | Stop reason: HOSPADM

## 2021-06-21 RX ORDER — MAGNESIUM SULFATE HEPTAHYDRATE 40 MG/ML
2 INJECTION, SOLUTION INTRAVENOUS ONCE
Status: COMPLETED | OUTPATIENT
Start: 2021-06-21 | End: 2021-06-21

## 2021-06-21 RX ORDER — HEPARIN SODIUM 1000 [USP'U]/ML
40 INJECTION, SOLUTION INTRAVENOUS; SUBCUTANEOUS AS NEEDED
Status: DISCONTINUED | OUTPATIENT
Start: 2021-06-21 | End: 2021-06-23

## 2021-06-21 RX ORDER — LAMOTRIGINE 100 MG/1
100 TABLET ORAL DAILY
Status: DISCONTINUED | OUTPATIENT
Start: 2021-06-22 | End: 2021-07-02 | Stop reason: HOSPADM

## 2021-06-21 RX ORDER — ONDANSETRON 2 MG/ML
4 INJECTION INTRAMUSCULAR; INTRAVENOUS EVERY 6 HOURS PRN
Status: DISCONTINUED | OUTPATIENT
Start: 2021-06-21 | End: 2021-06-28 | Stop reason: SDUPTHER

## 2021-06-21 RX ORDER — LISINOPRIL 10 MG/1
10 TABLET ORAL DAILY
Status: DISCONTINUED | OUTPATIENT
Start: 2021-06-22 | End: 2021-06-25

## 2021-06-21 RX ORDER — AMOXICILLIN 250 MG
1 CAPSULE ORAL 2 TIMES DAILY
Status: DISCONTINUED | OUTPATIENT
Start: 2021-06-21 | End: 2021-06-28 | Stop reason: SDUPTHER

## 2021-06-21 RX ORDER — HEPARIN SODIUM 10000 [USP'U]/100ML
18 INJECTION, SOLUTION INTRAVENOUS
Status: DISCONTINUED | OUTPATIENT
Start: 2021-06-21 | End: 2021-06-23

## 2021-06-21 RX ORDER — ATORVASTATIN CALCIUM 10 MG/1
20 TABLET, FILM COATED ORAL DAILY
Status: DISCONTINUED | OUTPATIENT
Start: 2021-06-22 | End: 2021-06-28 | Stop reason: SDUPTHER

## 2021-06-21 RX ORDER — SODIUM CHLORIDE 0.9 % (FLUSH) 0.9 %
10 SYRINGE (ML) INJECTION AS NEEDED
Status: DISCONTINUED | OUTPATIENT
Start: 2021-06-21 | End: 2021-06-28 | Stop reason: SDUPTHER

## 2021-06-21 RX ORDER — ACETAMINOPHEN 650 MG/1
650 SUPPOSITORY RECTAL EVERY 4 HOURS PRN
Status: DISCONTINUED | OUTPATIENT
Start: 2021-06-21 | End: 2021-06-28 | Stop reason: SDUPTHER

## 2021-06-21 RX ORDER — ACETAMINOPHEN 325 MG/1
650 TABLET ORAL EVERY 4 HOURS PRN
Status: DISCONTINUED | OUTPATIENT
Start: 2021-06-21 | End: 2021-06-28 | Stop reason: SDUPTHER

## 2021-06-21 RX ORDER — IPRATROPIUM BROMIDE AND ALBUTEROL SULFATE 2.5; .5 MG/3ML; MG/3ML
3 SOLUTION RESPIRATORY (INHALATION)
Status: DISCONTINUED | OUTPATIENT
Start: 2021-06-21 | End: 2021-06-28 | Stop reason: SDUPTHER

## 2021-06-21 RX ORDER — GABAPENTIN 100 MG/1
100 CAPSULE ORAL EVERY 8 HOURS SCHEDULED
Status: DISCONTINUED | OUTPATIENT
Start: 2021-06-21 | End: 2021-07-01

## 2021-06-21 RX ORDER — HEPARIN SODIUM 1000 [USP'U]/ML
80 INJECTION, SOLUTION INTRAVENOUS; SUBCUTANEOUS AS NEEDED
Status: DISCONTINUED | OUTPATIENT
Start: 2021-06-21 | End: 2021-06-23

## 2021-06-21 RX ORDER — ONDANSETRON 4 MG/1
4 TABLET, FILM COATED ORAL EVERY 6 HOURS PRN
Status: DISCONTINUED | OUTPATIENT
Start: 2021-06-21 | End: 2021-06-28 | Stop reason: SDUPTHER

## 2021-06-21 RX ORDER — CLOPIDOGREL BISULFATE 75 MG/1
75 TABLET ORAL DAILY
COMMUNITY
End: 2021-07-02 | Stop reason: HOSPADM

## 2021-06-21 RX ORDER — ONDANSETRON HYDROCHLORIDE 8 MG/1
8 TABLET, FILM COATED ORAL
COMMUNITY
End: 2021-07-02 | Stop reason: HOSPADM

## 2021-06-21 RX ORDER — DEXTROSE MONOHYDRATE 25 G/50ML
25 INJECTION, SOLUTION INTRAVENOUS
Status: DISCONTINUED | OUTPATIENT
Start: 2021-06-21 | End: 2021-06-28 | Stop reason: SDUPTHER

## 2021-06-21 RX ORDER — TRAMADOL HYDROCHLORIDE 50 MG/1
50 TABLET ORAL EVERY 8 HOURS PRN
COMMUNITY
End: 2021-07-02 | Stop reason: HOSPADM

## 2021-06-21 RX ORDER — LAMOTRIGINE 100 MG/1
100 TABLET ORAL DAILY
COMMUNITY

## 2021-06-21 RX ORDER — QUETIAPINE 200 MG/1
200 TABLET, FILM COATED, EXTENDED RELEASE ORAL NIGHTLY
Status: DISCONTINUED | OUTPATIENT
Start: 2021-06-21 | End: 2021-06-28 | Stop reason: SDUPTHER

## 2021-06-21 RX ORDER — QUETIAPINE 200 MG/1
200 TABLET, FILM COATED, EXTENDED RELEASE ORAL NIGHTLY
COMMUNITY

## 2021-06-21 RX ORDER — NICOTINE POLACRILEX 4 MG
15 LOZENGE BUCCAL
Status: DISCONTINUED | OUTPATIENT
Start: 2021-06-21 | End: 2021-06-28 | Stop reason: SDUPTHER

## 2021-06-21 RX ORDER — HEPARIN SODIUM 1000 [USP'U]/ML
80 INJECTION, SOLUTION INTRAVENOUS; SUBCUTANEOUS ONCE
Status: DISCONTINUED | OUTPATIENT
Start: 2021-06-21 | End: 2021-06-23

## 2021-06-21 RX ORDER — CETIRIZINE HYDROCHLORIDE 10 MG/1
10 TABLET ORAL DAILY
Status: ON HOLD | COMMUNITY
End: 2021-06-28

## 2021-06-21 RX ORDER — LISINOPRIL 10 MG/1
10 TABLET ORAL DAILY
COMMUNITY
End: 2021-07-02 | Stop reason: HOSPADM

## 2021-06-21 RX ORDER — HYDROMORPHONE HYDROCHLORIDE 2 MG/1
2 TABLET ORAL
Status: ON HOLD | COMMUNITY
End: 2021-06-28

## 2021-06-21 RX ORDER — SODIUM CHLORIDE 0.9 % (FLUSH) 0.9 %
10 SYRINGE (ML) INJECTION EVERY 12 HOURS SCHEDULED
Status: DISCONTINUED | OUTPATIENT
Start: 2021-06-21 | End: 2021-06-28 | Stop reason: SDUPTHER

## 2021-06-21 RX ORDER — OXYCODONE AND ACETAMINOPHEN 7.5; 325 MG/1; MG/1
1 TABLET ORAL EVERY 4 HOURS PRN
Status: DISCONTINUED | OUTPATIENT
Start: 2021-06-21 | End: 2021-06-28 | Stop reason: SDUPTHER

## 2021-06-21 RX ADMIN — DOCUSATE SODIUM 50 MG AND SENNOSIDES 8.6 MG 1 TABLET: 8.6; 5 TABLET, FILM COATED ORAL at 23:14

## 2021-06-21 RX ADMIN — IPRATROPIUM BROMIDE AND ALBUTEROL SULFATE 3 ML: 2.5; .5 SOLUTION RESPIRATORY (INHALATION) at 21:41

## 2021-06-21 RX ADMIN — INSULIN LISPRO 2 UNITS: 100 INJECTION, SOLUTION INTRAVENOUS; SUBCUTANEOUS at 23:32

## 2021-06-21 RX ADMIN — MAGNESIUM SULFATE HEPTAHYDRATE 2 G: 40 INJECTION, SOLUTION INTRAVENOUS at 23:14

## 2021-06-21 RX ADMIN — QUETIAPINE FUMARATE 200 MG: 200 TABLET, EXTENDED RELEASE ORAL at 23:14

## 2021-06-21 RX ADMIN — SODIUM CHLORIDE, PRESERVATIVE FREE 10 ML: 5 INJECTION INTRAVENOUS at 21:22

## 2021-06-21 RX ADMIN — OXYCODONE HYDROCHLORIDE AND ACETAMINOPHEN 1 TABLET: 7.5; 325 TABLET ORAL at 22:19

## 2021-06-21 RX ADMIN — SODIUM CHLORIDE, POTASSIUM CHLORIDE, SODIUM LACTATE AND CALCIUM CHLORIDE 50 ML/HR: 600; 310; 30; 20 INJECTION, SOLUTION INTRAVENOUS at 21:20

## 2021-06-21 RX ADMIN — VANCOMYCIN HYDROCHLORIDE 1250 MG: 10 INJECTION, POWDER, LYOPHILIZED, FOR SOLUTION INTRAVENOUS at 23:15

## 2021-06-21 RX ADMIN — GABAPENTIN 100 MG: 100 CAPSULE ORAL at 23:14

## 2021-06-22 ENCOUNTER — APPOINTMENT (OUTPATIENT)
Dept: CT IMAGING | Facility: HOSPITAL | Age: 46
End: 2021-06-22

## 2021-06-22 ENCOUNTER — APPOINTMENT (OUTPATIENT)
Dept: MRI IMAGING | Facility: HOSPITAL | Age: 46
End: 2021-06-22

## 2021-06-22 LAB
ALBUMIN SERPL-MCNC: 1.9 G/DL (ref 3.5–5.2)
ALBUMIN/GLOB SERPL: 0.5 G/DL
ALP SERPL-CCNC: 459 U/L (ref 39–117)
ALT SERPL W P-5'-P-CCNC: 31 U/L (ref 1–33)
ANION GAP SERPL CALCULATED.3IONS-SCNC: 9 MMOL/L (ref 5–15)
APTT PPP: 129.1 SECONDS (ref 24.1–35)
APTT PPP: 52.2 SECONDS (ref 24.1–35)
APTT PPP: 61.7 SECONDS (ref 24.1–35)
AST SERPL-CCNC: 38 U/L (ref 1–32)
BACTERIA UR QL AUTO: ABNORMAL /HPF
BASOPHILS # BLD AUTO: 0.06 10*3/MM3 (ref 0–0.2)
BASOPHILS NFR BLD AUTO: 0.6 % (ref 0–1.5)
BILIRUB SERPL-MCNC: 0.5 MG/DL (ref 0–1.2)
BILIRUB UR QL STRIP: NEGATIVE
BUN SERPL-MCNC: 6 MG/DL (ref 6–20)
BUN/CREAT SERPL: 8.2 (ref 7–25)
CALCIUM SPEC-SCNC: 10.8 MG/DL (ref 8.6–10.5)
CHLORIDE SERPL-SCNC: 104 MMOL/L (ref 98–107)
CHOLEST SERPL-MCNC: 63 MG/DL (ref 0–200)
CLARITY UR: CLEAR
CO2 SERPL-SCNC: 24 MMOL/L (ref 22–29)
COLOR UR: YELLOW
CREAT SERPL-MCNC: 0.73 MG/DL (ref 0.57–1)
DEPRECATED RDW RBC AUTO: 52.9 FL (ref 37–54)
EOSINOPHIL # BLD AUTO: 0 10*3/MM3 (ref 0–0.4)
EOSINOPHIL NFR BLD AUTO: 0 % (ref 0.3–6.2)
ERYTHROCYTE [DISTWIDTH] IN BLOOD BY AUTOMATED COUNT: 17.3 % (ref 12.3–15.4)
GFR SERPL CREATININE-BSD FRML MDRD: 87 ML/MIN/1.73
GLOBULIN UR ELPH-MCNC: 3.7 GM/DL
GLUCOSE BLDC GLUCOMTR-MCNC: 137 MG/DL (ref 70–130)
GLUCOSE BLDC GLUCOMTR-MCNC: 148 MG/DL (ref 70–130)
GLUCOSE BLDC GLUCOMTR-MCNC: 152 MG/DL (ref 70–130)
GLUCOSE BLDC GLUCOMTR-MCNC: 200 MG/DL (ref 70–130)
GLUCOSE SERPL-MCNC: 188 MG/DL (ref 65–99)
GLUCOSE UR STRIP-MCNC: NEGATIVE MG/DL
HBA1C MFR BLD: 6.9 % (ref 4.8–5.6)
HCT VFR BLD AUTO: 31.7 % (ref 34–46.6)
HDLC SERPL-MCNC: 23 MG/DL (ref 40–60)
HGB BLD-MCNC: 10 G/DL (ref 12–15.9)
HGB UR QL STRIP.AUTO: NEGATIVE
HYALINE CASTS UR QL AUTO: ABNORMAL /LPF
IMM GRANULOCYTES # BLD AUTO: 0.04 10*3/MM3 (ref 0–0.05)
IMM GRANULOCYTES NFR BLD AUTO: 0.4 % (ref 0–0.5)
KETONES UR QL STRIP: NEGATIVE
LDLC SERPL CALC-MCNC: 21 MG/DL (ref 0–100)
LDLC/HDLC SERPL: 0.89 {RATIO}
LEUKOCYTE ESTERASE UR QL STRIP.AUTO: ABNORMAL
LYMPHOCYTES # BLD AUTO: 2.43 10*3/MM3 (ref 0.7–3.1)
LYMPHOCYTES NFR BLD AUTO: 24.5 % (ref 19.6–45.3)
MCH RBC QN AUTO: 26.5 PG (ref 26.6–33)
MCHC RBC AUTO-ENTMCNC: 31.5 G/DL (ref 31.5–35.7)
MCV RBC AUTO: 83.9 FL (ref 79–97)
MONOCYTES # BLD AUTO: 0.87 10*3/MM3 (ref 0.1–0.9)
MONOCYTES NFR BLD AUTO: 8.8 % (ref 5–12)
NEUTROPHILS NFR BLD AUTO: 6.51 10*3/MM3 (ref 1.7–7)
NEUTROPHILS NFR BLD AUTO: 65.7 % (ref 42.7–76)
NITRITE UR QL STRIP: NEGATIVE
NRBC BLD AUTO-RTO: 0 /100 WBC (ref 0–0.2)
PH UR STRIP.AUTO: 5.5 [PH] (ref 5–8)
PLATELET # BLD AUTO: 526 10*3/MM3 (ref 140–450)
PMV BLD AUTO: 9.6 FL (ref 6–12)
POTASSIUM SERPL-SCNC: 4.7 MMOL/L (ref 3.5–5.2)
PREALB SERPL-MCNC: 6 MG/DL (ref 20–40)
PROT SERPL-MCNC: 5.6 G/DL (ref 6–8.5)
PROT UR QL STRIP: NEGATIVE
QT INTERVAL: 372 MS
QTC INTERVAL: 460 MS
RBC # BLD AUTO: 3.78 10*6/MM3 (ref 3.77–5.28)
RBC # UR: ABNORMAL /HPF
REF LAB TEST METHOD: ABNORMAL
SODIUM SERPL-SCNC: 137 MMOL/L (ref 136–145)
SP GR UR STRIP: 1.01 (ref 1–1.03)
SQUAMOUS #/AREA URNS HPF: ABNORMAL /HPF
TRIGL SERPL-MCNC: 98 MG/DL (ref 0–150)
UROBILINOGEN UR QL STRIP: ABNORMAL
VLDLC SERPL-MCNC: 19 MG/DL (ref 5–40)
WBC # BLD AUTO: 9.91 10*3/MM3 (ref 3.4–10.8)
WBC UR QL AUTO: ABNORMAL /HPF

## 2021-06-22 PROCEDURE — 81001 URINALYSIS AUTO W/SCOPE: CPT | Performed by: NURSE PRACTITIONER

## 2021-06-22 PROCEDURE — 70551 MRI BRAIN STEM W/O DYE: CPT

## 2021-06-22 PROCEDURE — 80061 LIPID PANEL: CPT | Performed by: NURSE PRACTITIONER

## 2021-06-22 PROCEDURE — 84134 ASSAY OF PREALBUMIN: CPT | Performed by: NURSE PRACTITIONER

## 2021-06-22 PROCEDURE — 93010 ELECTROCARDIOGRAM REPORT: CPT | Performed by: INTERNAL MEDICINE

## 2021-06-22 PROCEDURE — 63710000001 INSULIN LISPRO (HUMAN) PER 5 UNITS: Performed by: NURSE PRACTITIONER

## 2021-06-22 PROCEDURE — 99254 IP/OBS CNSLTJ NEW/EST MOD 60: CPT | Performed by: PSYCHIATRY & NEUROLOGY

## 2021-06-22 PROCEDURE — 0JBR0ZZ EXCISION OF LEFT FOOT SUBCUTANEOUS TISSUE AND FASCIA, OPEN APPROACH: ICD-10-PCS | Performed by: NURSE PRACTITIONER

## 2021-06-22 PROCEDURE — 25010000002 VANCOMYCIN 10 G RECONSTITUTED SOLUTION: Performed by: INTERNAL MEDICINE

## 2021-06-22 PROCEDURE — 93005 ELECTROCARDIOGRAM TRACING: CPT | Performed by: INTERNAL MEDICINE

## 2021-06-22 PROCEDURE — 87086 URINE CULTURE/COLONY COUNT: CPT | Performed by: NURSE PRACTITIONER

## 2021-06-22 PROCEDURE — 85730 THROMBOPLASTIN TIME PARTIAL: CPT | Performed by: NURSE PRACTITIONER

## 2021-06-22 PROCEDURE — 82962 GLUCOSE BLOOD TEST: CPT

## 2021-06-22 PROCEDURE — 94799 UNLISTED PULMONARY SVC/PX: CPT

## 2021-06-22 PROCEDURE — 85730 THROMBOPLASTIN TIME PARTIAL: CPT | Performed by: INTERNAL MEDICINE

## 2021-06-22 PROCEDURE — 97162 PT EVAL MOD COMPLEX 30 MIN: CPT

## 2021-06-22 PROCEDURE — 25010000002 HEPARIN (PORCINE) PER 1000 UNITS: Performed by: NURSE PRACTITIONER

## 2021-06-22 PROCEDURE — 85025 COMPLETE CBC W/AUTO DIFF WBC: CPT | Performed by: NURSE PRACTITIONER

## 2021-06-22 PROCEDURE — 93005 ELECTROCARDIOGRAM TRACING: CPT | Performed by: FAMILY MEDICINE

## 2021-06-22 PROCEDURE — 99222 1ST HOSP IP/OBS MODERATE 55: CPT | Performed by: NURSE PRACTITIONER

## 2021-06-22 PROCEDURE — 92610 EVALUATE SWALLOWING FUNCTION: CPT

## 2021-06-22 PROCEDURE — 85730 THROMBOPLASTIN TIME PARTIAL: CPT | Performed by: FAMILY MEDICINE

## 2021-06-22 PROCEDURE — 80053 COMPREHEN METABOLIC PANEL: CPT | Performed by: NURSE PRACTITIONER

## 2021-06-22 PROCEDURE — 99253 IP/OBS CNSLTJ NEW/EST LOW 45: CPT | Performed by: SURGERY

## 2021-06-22 PROCEDURE — 11042 DBRDMT SUBQ TIS 1ST 20SQCM/<: CPT | Performed by: NURSE PRACTITIONER

## 2021-06-22 PROCEDURE — 99255 IP/OBS CONSLTJ NEW/EST HI 80: CPT | Performed by: NURSE PRACTITIONER

## 2021-06-22 PROCEDURE — 83036 HEMOGLOBIN GLYCOSYLATED A1C: CPT | Performed by: NURSE PRACTITIONER

## 2021-06-22 RX ORDER — LIDOCAINE 40 MG/G
CREAM TOPICAL ONCE
Status: COMPLETED | OUTPATIENT
Start: 2021-06-22 | End: 2021-06-22

## 2021-06-22 RX ORDER — SODIUM CHLORIDE 9 MG/ML
100 INJECTION, SOLUTION INTRAVENOUS CONTINUOUS
Status: DISCONTINUED | OUTPATIENT
Start: 2021-06-22 | End: 2021-06-23

## 2021-06-22 RX ADMIN — DOCUSATE SODIUM 50 MG AND SENNOSIDES 8.6 MG 1 TABLET: 8.6; 5 TABLET, FILM COATED ORAL at 21:11

## 2021-06-22 RX ADMIN — LAMOTRIGINE 100 MG: 100 TABLET ORAL at 08:33

## 2021-06-22 RX ADMIN — GABAPENTIN 100 MG: 100 CAPSULE ORAL at 21:11

## 2021-06-22 RX ADMIN — OXYCODONE HYDROCHLORIDE AND ACETAMINOPHEN 1 TABLET: 7.5; 325 TABLET ORAL at 09:53

## 2021-06-22 RX ADMIN — HEPARIN SODIUM 2900 UNITS: 1000 INJECTION, SOLUTION INTRAVENOUS; SUBCUTANEOUS at 05:06

## 2021-06-22 RX ADMIN — LISINOPRIL 10 MG: 10 TABLET ORAL at 08:33

## 2021-06-22 RX ADMIN — HEPARIN SODIUM 1306 UNITS/HR: 10000 INJECTION, SOLUTION INTRAVENOUS at 01:19

## 2021-06-22 RX ADMIN — HEPARIN SODIUM 5810 UNITS: 1000 INJECTION, SOLUTION INTRAVENOUS; SUBCUTANEOUS at 19:03

## 2021-06-22 RX ADMIN — METOPROLOL TARTRATE 25 MG: 25 TABLET, FILM COATED ORAL at 15:24

## 2021-06-22 RX ADMIN — DOCUSATE SODIUM 50 MG AND SENNOSIDES 8.6 MG 1 TABLET: 8.6; 5 TABLET, FILM COATED ORAL at 08:34

## 2021-06-22 RX ADMIN — QUETIAPINE FUMARATE 200 MG: 200 TABLET, EXTENDED RELEASE ORAL at 21:12

## 2021-06-22 RX ADMIN — GABAPENTIN 100 MG: 100 CAPSULE ORAL at 14:59

## 2021-06-22 RX ADMIN — IPRATROPIUM BROMIDE AND ALBUTEROL SULFATE 3 ML: 2.5; .5 SOLUTION RESPIRATORY (INHALATION) at 19:22

## 2021-06-22 RX ADMIN — INSULIN LISPRO 4 UNITS: 100 INJECTION, SOLUTION INTRAVENOUS; SUBCUTANEOUS at 11:32

## 2021-06-22 RX ADMIN — HEPARIN SODIUM 1406 UNITS/HR: 10000 INJECTION, SOLUTION INTRAVENOUS at 22:00

## 2021-06-22 RX ADMIN — SODIUM CHLORIDE, PRESERVATIVE FREE 10 ML: 5 INJECTION INTRAVENOUS at 22:03

## 2021-06-22 RX ADMIN — LIDOCAINE 4% 1 APPLICATION: 4 CREAM TOPICAL at 10:01

## 2021-06-22 RX ADMIN — SODIUM CHLORIDE, PRESERVATIVE FREE 10 ML: 5 INJECTION INTRAVENOUS at 08:34

## 2021-06-22 RX ADMIN — INSULIN LISPRO 2 UNITS: 100 INJECTION, SOLUTION INTRAVENOUS; SUBCUTANEOUS at 17:13

## 2021-06-22 RX ADMIN — IPRATROPIUM BROMIDE AND ALBUTEROL SULFATE 3 ML: 2.5; .5 SOLUTION RESPIRATORY (INHALATION) at 06:09

## 2021-06-22 RX ADMIN — SODIUM CHLORIDE 100 ML/HR: 9 INJECTION, SOLUTION INTRAVENOUS at 15:22

## 2021-06-22 RX ADMIN — OXYCODONE HYDROCHLORIDE AND ACETAMINOPHEN 1 TABLET: 7.5; 325 TABLET ORAL at 17:08

## 2021-06-22 RX ADMIN — ATORVASTATIN CALCIUM 20 MG: 10 TABLET, FILM COATED ORAL at 08:33

## 2021-06-22 RX ADMIN — VANCOMYCIN HYDROCHLORIDE 1250 MG: 10 INJECTION, POWDER, LYOPHILIZED, FOR SOLUTION INTRAVENOUS at 11:53

## 2021-06-22 RX ADMIN — OXYCODONE HYDROCHLORIDE AND ACETAMINOPHEN 1 TABLET: 7.5; 325 TABLET ORAL at 21:11

## 2021-06-22 RX ADMIN — IPRATROPIUM BROMIDE AND ALBUTEROL SULFATE 3 ML: 2.5; .5 SOLUTION RESPIRATORY (INHALATION) at 13:12

## 2021-06-22 RX ADMIN — GABAPENTIN 100 MG: 100 CAPSULE ORAL at 08:34

## 2021-06-23 ENCOUNTER — APPOINTMENT (OUTPATIENT)
Dept: GENERAL RADIOLOGY | Facility: HOSPITAL | Age: 46
End: 2021-06-23

## 2021-06-23 LAB
ANION GAP SERPL CALCULATED.3IONS-SCNC: 6 MMOL/L (ref 5–15)
APTT PPP: 111.2 SECONDS (ref 24.1–35)
APTT PPP: >200 SECONDS (ref 24.1–35)
BACTERIA SPEC AEROBE CULT: NO GROWTH
BASOPHILS # BLD AUTO: 0.09 10*3/MM3 (ref 0–0.2)
BASOPHILS NFR BLD AUTO: 0.8 % (ref 0–1.5)
BUN SERPL-MCNC: 7 MG/DL (ref 6–20)
BUN/CREAT SERPL: 8.9 (ref 7–25)
CALCIUM SPEC-SCNC: 10.3 MG/DL (ref 8.6–10.5)
CHLORIDE SERPL-SCNC: 104 MMOL/L (ref 98–107)
CO2 SERPL-SCNC: 26 MMOL/L (ref 22–29)
CREAT SERPL-MCNC: 0.79 MG/DL (ref 0.57–1)
DEPRECATED RDW RBC AUTO: 53.7 FL (ref 37–54)
EOSINOPHIL # BLD AUTO: 0 10*3/MM3 (ref 0–0.4)
EOSINOPHIL NFR BLD AUTO: 0 % (ref 0.3–6.2)
ERYTHROCYTE [DISTWIDTH] IN BLOOD BY AUTOMATED COUNT: 17.7 % (ref 12.3–15.4)
GFR SERPL CREATININE-BSD FRML MDRD: 79 ML/MIN/1.73
GLUCOSE BLDC GLUCOMTR-MCNC: 146 MG/DL (ref 70–130)
GLUCOSE BLDC GLUCOMTR-MCNC: 158 MG/DL (ref 70–130)
GLUCOSE BLDC GLUCOMTR-MCNC: 161 MG/DL (ref 70–130)
GLUCOSE BLDC GLUCOMTR-MCNC: 169 MG/DL (ref 70–130)
GLUCOSE BLDC GLUCOMTR-MCNC: 201 MG/DL (ref 70–130)
GLUCOSE SERPL-MCNC: 166 MG/DL (ref 65–99)
HCT VFR BLD AUTO: 29.8 % (ref 34–46.6)
HGB BLD-MCNC: 9.5 G/DL (ref 12–15.9)
IMM GRANULOCYTES # BLD AUTO: 0.03 10*3/MM3 (ref 0–0.05)
IMM GRANULOCYTES NFR BLD AUTO: 0.3 % (ref 0–0.5)
LYMPHOCYTES # BLD AUTO: 2.97 10*3/MM3 (ref 0.7–3.1)
LYMPHOCYTES NFR BLD AUTO: 26.3 % (ref 19.6–45.3)
MCH RBC QN AUTO: 26.5 PG (ref 26.6–33)
MCHC RBC AUTO-ENTMCNC: 31.9 G/DL (ref 31.5–35.7)
MCV RBC AUTO: 83.2 FL (ref 79–97)
MONOCYTES # BLD AUTO: 0.8 10*3/MM3 (ref 0.1–0.9)
MONOCYTES NFR BLD AUTO: 7.1 % (ref 5–12)
NEUTROPHILS NFR BLD AUTO: 65.5 % (ref 42.7–76)
NEUTROPHILS NFR BLD AUTO: 7.39 10*3/MM3 (ref 1.7–7)
NRBC BLD AUTO-RTO: 0 /100 WBC (ref 0–0.2)
PLATELET # BLD AUTO: 527 10*3/MM3 (ref 140–450)
PMV BLD AUTO: 9.3 FL (ref 6–12)
POTASSIUM SERPL-SCNC: 3.7 MMOL/L (ref 3.5–5.2)
QT INTERVAL: 386 MS
QTC INTERVAL: 453 MS
RBC # BLD AUTO: 3.58 10*6/MM3 (ref 3.77–5.28)
SODIUM SERPL-SCNC: 136 MMOL/L (ref 136–145)
WBC # BLD AUTO: 11.28 10*3/MM3 (ref 3.4–10.8)

## 2021-06-23 PROCEDURE — 94799 UNLISTED PULMONARY SVC/PX: CPT

## 2021-06-23 PROCEDURE — 25010000002 ENOXAPARIN PER 10 MG: Performed by: FAMILY MEDICINE

## 2021-06-23 PROCEDURE — 25010000002 AMPICILLIN PER 500 MG: Performed by: INTERNAL MEDICINE

## 2021-06-23 PROCEDURE — 25010000002 LORAZEPAM PER 2 MG: Performed by: FAMILY MEDICINE

## 2021-06-23 PROCEDURE — 93010 ELECTROCARDIOGRAM REPORT: CPT | Performed by: INTERNAL MEDICINE

## 2021-06-23 PROCEDURE — 82962 GLUCOSE BLOOD TEST: CPT

## 2021-06-23 PROCEDURE — 93005 ELECTROCARDIOGRAM TRACING: CPT | Performed by: FAMILY MEDICINE

## 2021-06-23 PROCEDURE — 25010000002 FUROSEMIDE PER 20 MG: Performed by: FAMILY MEDICINE

## 2021-06-23 PROCEDURE — 25010000002 ONDANSETRON PER 1 MG: Performed by: NURSE PRACTITIONER

## 2021-06-23 PROCEDURE — 71045 X-RAY EXAM CHEST 1 VIEW: CPT

## 2021-06-23 PROCEDURE — 63710000001 INSULIN LISPRO (HUMAN) PER 5 UNITS: Performed by: NURSE PRACTITIONER

## 2021-06-23 PROCEDURE — 25010000002 VANCOMYCIN 10 G RECONSTITUTED SOLUTION: Performed by: INTERNAL MEDICINE

## 2021-06-23 PROCEDURE — 99232 SBSQ HOSP IP/OBS MODERATE 35: CPT | Performed by: SURGERY

## 2021-06-23 PROCEDURE — 85730 THROMBOPLASTIN TIME PARTIAL: CPT | Performed by: FAMILY MEDICINE

## 2021-06-23 PROCEDURE — 85025 COMPLETE CBC W/AUTO DIFF WBC: CPT | Performed by: FAMILY MEDICINE

## 2021-06-23 PROCEDURE — 97530 THERAPEUTIC ACTIVITIES: CPT

## 2021-06-23 PROCEDURE — 99233 SBSQ HOSP IP/OBS HIGH 50: CPT | Performed by: PSYCHIATRY & NEUROLOGY

## 2021-06-23 PROCEDURE — 80048 BASIC METABOLIC PNL TOTAL CA: CPT | Performed by: FAMILY MEDICINE

## 2021-06-23 RX ORDER — LORAZEPAM 2 MG/ML
1 INJECTION INTRAMUSCULAR EVERY 4 HOURS PRN
Status: DISCONTINUED | OUTPATIENT
Start: 2021-06-23 | End: 2021-06-28 | Stop reason: SDUPTHER

## 2021-06-23 RX ORDER — FUROSEMIDE 10 MG/ML
40 INJECTION INTRAMUSCULAR; INTRAVENOUS ONCE
Status: COMPLETED | OUTPATIENT
Start: 2021-06-23 | End: 2021-06-23

## 2021-06-23 RX ADMIN — METOPROLOL TARTRATE 25 MG: 25 TABLET, FILM COATED ORAL at 08:51

## 2021-06-23 RX ADMIN — VANCOMYCIN HYDROCHLORIDE 1250 MG: 10 INJECTION, POWDER, LYOPHILIZED, FOR SOLUTION INTRAVENOUS at 00:25

## 2021-06-23 RX ADMIN — LORAZEPAM 1 MG: 2 INJECTION INTRAMUSCULAR; INTRAVENOUS at 15:15

## 2021-06-23 RX ADMIN — DOCUSATE SODIUM 50 MG AND SENNOSIDES 8.6 MG 1 TABLET: 8.6; 5 TABLET, FILM COATED ORAL at 08:52

## 2021-06-23 RX ADMIN — IPRATROPIUM BROMIDE AND ALBUTEROL SULFATE 3 ML: 2.5; .5 SOLUTION RESPIRATORY (INHALATION) at 01:26

## 2021-06-23 RX ADMIN — OXYCODONE HYDROCHLORIDE AND ACETAMINOPHEN 1 TABLET: 7.5; 325 TABLET ORAL at 08:59

## 2021-06-23 RX ADMIN — IPRATROPIUM BROMIDE AND ALBUTEROL SULFATE 3 ML: 2.5; .5 SOLUTION RESPIRATORY (INHALATION) at 14:46

## 2021-06-23 RX ADMIN — VANCOMYCIN HYDROCHLORIDE 1250 MG: 10 INJECTION, POWDER, LYOPHILIZED, FOR SOLUTION INTRAVENOUS at 12:39

## 2021-06-23 RX ADMIN — SODIUM CHLORIDE, PRESERVATIVE FREE 10 ML: 5 INJECTION INTRAVENOUS at 21:37

## 2021-06-23 RX ADMIN — ONDANSETRON HYDROCHLORIDE 4 MG: 2 SOLUTION INTRAMUSCULAR; INTRAVENOUS at 14:31

## 2021-06-23 RX ADMIN — METOPROLOL TARTRATE 25 MG: 25 TABLET, FILM COATED ORAL at 21:37

## 2021-06-23 RX ADMIN — IPRATROPIUM BROMIDE AND ALBUTEROL SULFATE 3 ML: 2.5; .5 SOLUTION RESPIRATORY (INHALATION) at 07:30

## 2021-06-23 RX ADMIN — DOCUSATE SODIUM 50 MG AND SENNOSIDES 8.6 MG 1 TABLET: 8.6; 5 TABLET, FILM COATED ORAL at 21:38

## 2021-06-23 RX ADMIN — LISINOPRIL 10 MG: 10 TABLET ORAL at 08:51

## 2021-06-23 RX ADMIN — ATORVASTATIN CALCIUM 20 MG: 10 TABLET, FILM COATED ORAL at 08:51

## 2021-06-23 RX ADMIN — ENOXAPARIN SODIUM 40 MG: 40 INJECTION SUBCUTANEOUS at 17:37

## 2021-06-23 RX ADMIN — IPRATROPIUM BROMIDE AND ALBUTEROL SULFATE 3 ML: 2.5; .5 SOLUTION RESPIRATORY (INHALATION) at 19:59

## 2021-06-23 RX ADMIN — INSULIN LISPRO 2 UNITS: 100 INJECTION, SOLUTION INTRAVENOUS; SUBCUTANEOUS at 21:37

## 2021-06-23 RX ADMIN — INSULIN LISPRO 2 UNITS: 100 INJECTION, SOLUTION INTRAVENOUS; SUBCUTANEOUS at 17:37

## 2021-06-23 RX ADMIN — AMPICILLIN SODIUM 2 G: 2 INJECTION, POWDER, FOR SOLUTION INTRAVENOUS at 21:37

## 2021-06-23 RX ADMIN — OXYCODONE HYDROCHLORIDE AND ACETAMINOPHEN 1 TABLET: 7.5; 325 TABLET ORAL at 14:53

## 2021-06-23 RX ADMIN — QUETIAPINE FUMARATE 200 MG: 200 TABLET, EXTENDED RELEASE ORAL at 21:37

## 2021-06-23 RX ADMIN — GABAPENTIN 100 MG: 100 CAPSULE ORAL at 06:17

## 2021-06-23 RX ADMIN — SODIUM CHLORIDE 100 ML/HR: 9 INJECTION, SOLUTION INTRAVENOUS at 00:29

## 2021-06-23 RX ADMIN — AMPICILLIN SODIUM 2 G: 2 INJECTION, POWDER, FOR SOLUTION INTRAVENOUS at 17:20

## 2021-06-23 RX ADMIN — SODIUM CHLORIDE 100 ML/HR: 9 INJECTION, SOLUTION INTRAVENOUS at 12:39

## 2021-06-23 RX ADMIN — OXYCODONE HYDROCHLORIDE AND ACETAMINOPHEN 1 TABLET: 7.5; 325 TABLET ORAL at 03:37

## 2021-06-23 RX ADMIN — FUROSEMIDE 40 MG: 10 INJECTION, SOLUTION INTRAVENOUS at 15:15

## 2021-06-23 RX ADMIN — GABAPENTIN 100 MG: 100 CAPSULE ORAL at 21:37

## 2021-06-23 RX ADMIN — INSULIN LISPRO 4 UNITS: 100 INJECTION, SOLUTION INTRAVENOUS; SUBCUTANEOUS at 12:39

## 2021-06-23 RX ADMIN — LAMOTRIGINE 100 MG: 100 TABLET ORAL at 08:51

## 2021-06-24 ENCOUNTER — APPOINTMENT (OUTPATIENT)
Dept: ULTRASOUND IMAGING | Facility: HOSPITAL | Age: 46
End: 2021-06-24

## 2021-06-24 ENCOUNTER — APPOINTMENT (OUTPATIENT)
Dept: CT IMAGING | Facility: HOSPITAL | Age: 46
End: 2021-06-24

## 2021-06-24 LAB
ANION GAP SERPL CALCULATED.3IONS-SCNC: 6 MMOL/L (ref 5–15)
BASOPHILS # BLD AUTO: 0.07 10*3/MM3 (ref 0–0.2)
BASOPHILS NFR BLD AUTO: 0.7 % (ref 0–1.5)
BUN SERPL-MCNC: 8 MG/DL (ref 6–20)
BUN/CREAT SERPL: 7.9 (ref 7–25)
CALCIUM SPEC-SCNC: 10.9 MG/DL (ref 8.6–10.5)
CHLORIDE SERPL-SCNC: 104 MMOL/L (ref 98–107)
CO2 SERPL-SCNC: 29 MMOL/L (ref 22–29)
CREAT SERPL-MCNC: 1.01 MG/DL (ref 0.57–1)
DEPRECATED RDW RBC AUTO: 55.2 FL (ref 37–54)
EOSINOPHIL # BLD AUTO: 0 10*3/MM3 (ref 0–0.4)
EOSINOPHIL NFR BLD AUTO: 0 % (ref 0.3–6.2)
ERYTHROCYTE [DISTWIDTH] IN BLOOD BY AUTOMATED COUNT: 18.1 % (ref 12.3–15.4)
GFR SERPL CREATININE-BSD FRML MDRD: 60 ML/MIN/1.73
GLUCOSE BLDC GLUCOMTR-MCNC: 146 MG/DL (ref 70–130)
GLUCOSE BLDC GLUCOMTR-MCNC: 179 MG/DL (ref 70–130)
GLUCOSE BLDC GLUCOMTR-MCNC: 183 MG/DL (ref 70–130)
GLUCOSE BLDC GLUCOMTR-MCNC: 184 MG/DL (ref 70–130)
GLUCOSE SERPL-MCNC: 159 MG/DL (ref 65–99)
HCT VFR BLD AUTO: 30.9 % (ref 34–46.6)
HGB BLD-MCNC: 9.7 G/DL (ref 12–15.9)
IMM GRANULOCYTES # BLD AUTO: 0.05 10*3/MM3 (ref 0–0.05)
IMM GRANULOCYTES NFR BLD AUTO: 0.5 % (ref 0–0.5)
LYMPHOCYTES # BLD AUTO: 2.59 10*3/MM3 (ref 0.7–3.1)
LYMPHOCYTES NFR BLD AUTO: 25.3 % (ref 19.6–45.3)
MCH RBC QN AUTO: 26.5 PG (ref 26.6–33)
MCHC RBC AUTO-ENTMCNC: 31.4 G/DL (ref 31.5–35.7)
MCV RBC AUTO: 84.4 FL (ref 79–97)
MONOCYTES # BLD AUTO: 0.75 10*3/MM3 (ref 0.1–0.9)
MONOCYTES NFR BLD AUTO: 7.3 % (ref 5–12)
NEUTROPHILS NFR BLD AUTO: 6.77 10*3/MM3 (ref 1.7–7)
NEUTROPHILS NFR BLD AUTO: 66.2 % (ref 42.7–76)
NRBC BLD AUTO-RTO: 0 /100 WBC (ref 0–0.2)
PLATELET # BLD AUTO: 574 10*3/MM3 (ref 140–450)
PMV BLD AUTO: 9.8 FL (ref 6–12)
POTASSIUM SERPL-SCNC: 3.4 MMOL/L (ref 3.5–5.2)
PTH-INTACT SERPL-MCNC: 3.3 PG/ML (ref 15–65)
QT INTERVAL: 364 MS
QTC INTERVAL: 450 MS
RBC # BLD AUTO: 3.66 10*6/MM3 (ref 3.77–5.28)
SODIUM SERPL-SCNC: 139 MMOL/L (ref 136–145)
WBC # BLD AUTO: 10.23 10*3/MM3 (ref 3.4–10.8)

## 2021-06-24 PROCEDURE — 75574 CT ANGIO HRT W/3D IMAGE: CPT

## 2021-06-24 PROCEDURE — 92526 ORAL FUNCTION THERAPY: CPT

## 2021-06-24 PROCEDURE — 83970 ASSAY OF PARATHORMONE: CPT | Performed by: PSYCHIATRY & NEUROLOGY

## 2021-06-24 PROCEDURE — 97530 THERAPEUTIC ACTIVITIES: CPT

## 2021-06-24 PROCEDURE — 85025 COMPLETE CBC W/AUTO DIFF WBC: CPT | Performed by: FAMILY MEDICINE

## 2021-06-24 PROCEDURE — 63710000001 INSULIN LISPRO (HUMAN) PER 5 UNITS: Performed by: NURSE PRACTITIONER

## 2021-06-24 PROCEDURE — 0 IOPAMIDOL PER 1 ML: Performed by: FAMILY MEDICINE

## 2021-06-24 PROCEDURE — 99231 SBSQ HOSP IP/OBS SF/LOW 25: CPT | Performed by: NURSE PRACTITIONER

## 2021-06-24 PROCEDURE — 25010000002 AMPICILLIN PER 500 MG: Performed by: INTERNAL MEDICINE

## 2021-06-24 PROCEDURE — 86431 RHEUMATOID FACTOR QUANT: CPT | Performed by: INTERNAL MEDICINE

## 2021-06-24 PROCEDURE — 25010000002 LORAZEPAM PER 2 MG: Performed by: FAMILY MEDICINE

## 2021-06-24 PROCEDURE — 99232 SBSQ HOSP IP/OBS MODERATE 35: CPT | Performed by: CLINICAL NURSE SPECIALIST

## 2021-06-24 PROCEDURE — 80048 BASIC METABOLIC PNL TOTAL CA: CPT | Performed by: FAMILY MEDICINE

## 2021-06-24 PROCEDURE — 76705 ECHO EXAM OF ABDOMEN: CPT

## 2021-06-24 PROCEDURE — 75574 CT ANGIO HRT W/3D IMAGE: CPT | Performed by: INTERNAL MEDICINE

## 2021-06-24 PROCEDURE — 25010000002 ENOXAPARIN PER 10 MG: Performed by: FAMILY MEDICINE

## 2021-06-24 PROCEDURE — 94799 UNLISTED PULMONARY SVC/PX: CPT

## 2021-06-24 PROCEDURE — 82962 GLUCOSE BLOOD TEST: CPT

## 2021-06-24 RX ORDER — NITROGLYCERIN 0.4 MG/1
0.4 TABLET SUBLINGUAL
Status: COMPLETED | OUTPATIENT
Start: 2021-06-24 | End: 2021-06-24

## 2021-06-24 RX ORDER — HYDROXYZINE HYDROCHLORIDE 25 MG/1
25 TABLET, FILM COATED ORAL EVERY 6 HOURS PRN
Status: DISCONTINUED | OUTPATIENT
Start: 2021-06-24 | End: 2021-06-28 | Stop reason: SDUPTHER

## 2021-06-24 RX ADMIN — GABAPENTIN 100 MG: 100 CAPSULE ORAL at 20:51

## 2021-06-24 RX ADMIN — LISINOPRIL 10 MG: 10 TABLET ORAL at 07:33

## 2021-06-24 RX ADMIN — METOROPROLOL TARTRATE 5 MG: 5 INJECTION, SOLUTION INTRAVENOUS at 08:40

## 2021-06-24 RX ADMIN — IPRATROPIUM BROMIDE AND ALBUTEROL SULFATE 3 ML: 2.5; .5 SOLUTION RESPIRATORY (INHALATION) at 07:04

## 2021-06-24 RX ADMIN — NITROGLYCERIN 0.4 MG: 0.4 TABLET SUBLINGUAL at 08:40

## 2021-06-24 RX ADMIN — QUETIAPINE FUMARATE 200 MG: 200 TABLET, EXTENDED RELEASE ORAL at 20:51

## 2021-06-24 RX ADMIN — AMPICILLIN SODIUM 2 G: 2 INJECTION, POWDER, FOR SOLUTION INTRAVENOUS at 07:33

## 2021-06-24 RX ADMIN — INSULIN LISPRO 2 UNITS: 100 INJECTION, SOLUTION INTRAVENOUS; SUBCUTANEOUS at 20:51

## 2021-06-24 RX ADMIN — GABAPENTIN 100 MG: 100 CAPSULE ORAL at 06:08

## 2021-06-24 RX ADMIN — ATORVASTATIN CALCIUM 20 MG: 10 TABLET, FILM COATED ORAL at 07:34

## 2021-06-24 RX ADMIN — LORAZEPAM 1 MG: 2 INJECTION INTRAMUSCULAR; INTRAVENOUS at 01:35

## 2021-06-24 RX ADMIN — IPRATROPIUM BROMIDE AND ALBUTEROL SULFATE 3 ML: 2.5; .5 SOLUTION RESPIRATORY (INHALATION) at 18:31

## 2021-06-24 RX ADMIN — SODIUM CHLORIDE, PRESERVATIVE FREE 10 ML: 5 INJECTION INTRAVENOUS at 08:28

## 2021-06-24 RX ADMIN — METOPROLOL TARTRATE 25 MG: 25 TABLET, FILM COATED ORAL at 07:34

## 2021-06-24 RX ADMIN — AMPICILLIN SODIUM 2 G: 2 INJECTION, POWDER, FOR SOLUTION INTRAVENOUS at 13:41

## 2021-06-24 RX ADMIN — INSULIN LISPRO 2 UNITS: 100 INJECTION, SOLUTION INTRAVENOUS; SUBCUTANEOUS at 17:34

## 2021-06-24 RX ADMIN — IPRATROPIUM BROMIDE AND ALBUTEROL SULFATE 3 ML: 2.5; .5 SOLUTION RESPIRATORY (INHALATION) at 01:16

## 2021-06-24 RX ADMIN — IOPAMIDOL 75 ML: 755 INJECTION, SOLUTION INTRAVENOUS at 09:00

## 2021-06-24 RX ADMIN — LAMOTRIGINE 100 MG: 100 TABLET ORAL at 07:33

## 2021-06-24 RX ADMIN — AMPICILLIN SODIUM 2 G: 2 INJECTION, POWDER, FOR SOLUTION INTRAVENOUS at 20:51

## 2021-06-24 RX ADMIN — HYDROXYZINE HYDROCHLORIDE 25 MG: 25 TABLET, FILM COATED ORAL at 18:25

## 2021-06-24 RX ADMIN — METOPROLOL TARTRATE 25 MG: 25 TABLET, FILM COATED ORAL at 20:52

## 2021-06-24 RX ADMIN — DOCUSATE SODIUM 50 MG AND SENNOSIDES 8.6 MG 1 TABLET: 8.6; 5 TABLET, FILM COATED ORAL at 20:51

## 2021-06-24 RX ADMIN — ENOXAPARIN SODIUM 40 MG: 40 INJECTION SUBCUTANEOUS at 17:34

## 2021-06-24 RX ADMIN — AMPICILLIN SODIUM 2 G: 2 INJECTION, POWDER, FOR SOLUTION INTRAVENOUS at 00:46

## 2021-06-24 RX ADMIN — IPRATROPIUM BROMIDE AND ALBUTEROL SULFATE 3 ML: 2.5; .5 SOLUTION RESPIRATORY (INHALATION) at 14:14

## 2021-06-24 RX ADMIN — AMPICILLIN SODIUM 2 G: 2 INJECTION, POWDER, FOR SOLUTION INTRAVENOUS at 03:50

## 2021-06-24 RX ADMIN — OXYCODONE HYDROCHLORIDE AND ACETAMINOPHEN 1 TABLET: 7.5; 325 TABLET ORAL at 07:33

## 2021-06-24 RX ADMIN — DOCUSATE SODIUM 50 MG AND SENNOSIDES 8.6 MG 1 TABLET: 8.6; 5 TABLET, FILM COATED ORAL at 07:34

## 2021-06-24 RX ADMIN — AMPICILLIN SODIUM 2 G: 2 INJECTION, POWDER, FOR SOLUTION INTRAVENOUS at 17:31

## 2021-06-24 RX ADMIN — DEXTROMETHORPHAN HYDROBROMIDE AND QUINIDINE SULFATE 1 CAPSULE: 20; 10 CAPSULE, GELATIN COATED ORAL at 07:34

## 2021-06-24 RX ADMIN — SODIUM CHLORIDE, PRESERVATIVE FREE 10 ML: 5 INJECTION INTRAVENOUS at 20:51

## 2021-06-24 RX ADMIN — METOROPROLOL TARTRATE 5 MG: 5 INJECTION, SOLUTION INTRAVENOUS at 08:50

## 2021-06-25 LAB
ANION GAP SERPL CALCULATED.3IONS-SCNC: 7 MMOL/L (ref 5–15)
BASOPHILS # BLD AUTO: 0.08 10*3/MM3 (ref 0–0.2)
BASOPHILS NFR BLD AUTO: 0.8 % (ref 0–1.5)
BUN SERPL-MCNC: 10 MG/DL (ref 6–20)
BUN/CREAT SERPL: 6.7 (ref 7–25)
CALCIUM SPEC-SCNC: 10.9 MG/DL (ref 8.6–10.5)
CHLORIDE SERPL-SCNC: 103 MMOL/L (ref 98–107)
CHROMATIN AB SERPL-ACNC: 11.1 IU/ML (ref 0–14)
CO2 SERPL-SCNC: 29 MMOL/L (ref 22–29)
CREAT SERPL-MCNC: 1.49 MG/DL (ref 0.57–1)
DEPRECATED RDW RBC AUTO: 56.8 FL (ref 37–54)
EOSINOPHIL # BLD AUTO: 0 10*3/MM3 (ref 0–0.4)
EOSINOPHIL NFR BLD AUTO: 0 % (ref 0.3–6.2)
ERYTHROCYTE [DISTWIDTH] IN BLOOD BY AUTOMATED COUNT: 18.6 % (ref 12.3–15.4)
ERYTHROCYTE [SEDIMENTATION RATE] IN BLOOD: 38 MM/HR (ref 0–20)
GFR SERPL CREATININE-BSD FRML MDRD: 38 ML/MIN/1.73
GLUCOSE BLDC GLUCOMTR-MCNC: 147 MG/DL (ref 70–130)
GLUCOSE BLDC GLUCOMTR-MCNC: 149 MG/DL (ref 70–130)
GLUCOSE BLDC GLUCOMTR-MCNC: 151 MG/DL (ref 70–130)
GLUCOSE BLDC GLUCOMTR-MCNC: 160 MG/DL (ref 70–130)
GLUCOSE SERPL-MCNC: 154 MG/DL (ref 65–99)
HCT VFR BLD AUTO: 30.8 % (ref 34–46.6)
HGB BLD-MCNC: 9.8 G/DL (ref 12–15.9)
IMM GRANULOCYTES # BLD AUTO: 0.04 10*3/MM3 (ref 0–0.05)
IMM GRANULOCYTES NFR BLD AUTO: 0.4 % (ref 0–0.5)
LYMPHOCYTES # BLD AUTO: 2.85 10*3/MM3 (ref 0.7–3.1)
LYMPHOCYTES NFR BLD AUTO: 29.4 % (ref 19.6–45.3)
MCH RBC QN AUTO: 26.7 PG (ref 26.6–33)
MCHC RBC AUTO-ENTMCNC: 31.8 G/DL (ref 31.5–35.7)
MCV RBC AUTO: 83.9 FL (ref 79–97)
MONOCYTES # BLD AUTO: 0.92 10*3/MM3 (ref 0.1–0.9)
MONOCYTES NFR BLD AUTO: 9.5 % (ref 5–12)
NEUTROPHILS NFR BLD AUTO: 5.82 10*3/MM3 (ref 1.7–7)
NEUTROPHILS NFR BLD AUTO: 59.9 % (ref 42.7–76)
NRBC BLD AUTO-RTO: 0 /100 WBC (ref 0–0.2)
PLATELET # BLD AUTO: 557 10*3/MM3 (ref 140–450)
PMV BLD AUTO: 9.6 FL (ref 6–12)
POTASSIUM SERPL-SCNC: 3.5 MMOL/L (ref 3.5–5.2)
RBC # BLD AUTO: 3.67 10*6/MM3 (ref 3.77–5.28)
SODIUM SERPL-SCNC: 139 MMOL/L (ref 136–145)
WBC # BLD AUTO: 9.71 10*3/MM3 (ref 3.4–10.8)

## 2021-06-25 PROCEDURE — 25010000002 LORAZEPAM PER 2 MG: Performed by: FAMILY MEDICINE

## 2021-06-25 PROCEDURE — 25010000002 CEFTRIAXONE PER 250 MG: Performed by: INTERNAL MEDICINE

## 2021-06-25 PROCEDURE — 99232 SBSQ HOSP IP/OBS MODERATE 35: CPT | Performed by: CLINICAL NURSE SPECIALIST

## 2021-06-25 PROCEDURE — 94799 UNLISTED PULMONARY SVC/PX: CPT

## 2021-06-25 PROCEDURE — 25010000002 ENOXAPARIN PER 10 MG: Performed by: FAMILY MEDICINE

## 2021-06-25 PROCEDURE — 25010000003 HYDROMORPHONE 1 MG/ML SOLUTION: Performed by: FAMILY MEDICINE

## 2021-06-25 PROCEDURE — 97110 THERAPEUTIC EXERCISES: CPT

## 2021-06-25 PROCEDURE — 99231 SBSQ HOSP IP/OBS SF/LOW 25: CPT | Performed by: NURSE PRACTITIONER

## 2021-06-25 PROCEDURE — 97116 GAIT TRAINING THERAPY: CPT

## 2021-06-25 PROCEDURE — 85025 COMPLETE CBC W/AUTO DIFF WBC: CPT | Performed by: FAMILY MEDICINE

## 2021-06-25 PROCEDURE — 82962 GLUCOSE BLOOD TEST: CPT

## 2021-06-25 PROCEDURE — 25010000002 AMPICILLIN PER 500 MG: Performed by: INTERNAL MEDICINE

## 2021-06-25 PROCEDURE — 63710000001 INSULIN LISPRO (HUMAN) PER 5 UNITS: Performed by: NURSE PRACTITIONER

## 2021-06-25 PROCEDURE — 99232 SBSQ HOSP IP/OBS MODERATE 35: CPT | Performed by: SURGERY

## 2021-06-25 PROCEDURE — 85651 RBC SED RATE NONAUTOMATED: CPT | Performed by: INTERNAL MEDICINE

## 2021-06-25 PROCEDURE — 80048 BASIC METABOLIC PNL TOTAL CA: CPT | Performed by: FAMILY MEDICINE

## 2021-06-25 RX ORDER — SODIUM CHLORIDE 9 MG/ML
75 INJECTION, SOLUTION INTRAVENOUS CONTINUOUS
Status: DISCONTINUED | OUTPATIENT
Start: 2021-06-25 | End: 2021-06-27

## 2021-06-25 RX ADMIN — IPRATROPIUM BROMIDE AND ALBUTEROL SULFATE 3 ML: 2.5; .5 SOLUTION RESPIRATORY (INHALATION) at 00:22

## 2021-06-25 RX ADMIN — HYDROXYZINE HYDROCHLORIDE 25 MG: 25 TABLET, FILM COATED ORAL at 13:01

## 2021-06-25 RX ADMIN — SODIUM CHLORIDE, PRESERVATIVE FREE 10 ML: 5 INJECTION INTRAVENOUS at 21:57

## 2021-06-25 RX ADMIN — DOCUSATE SODIUM 50 MG AND SENNOSIDES 8.6 MG 1 TABLET: 8.6; 5 TABLET, FILM COATED ORAL at 09:23

## 2021-06-25 RX ADMIN — LORAZEPAM 1 MG: 2 INJECTION INTRAMUSCULAR; INTRAVENOUS at 15:39

## 2021-06-25 RX ADMIN — HYDROMORPHONE HYDROCHLORIDE 1 MG: 1 INJECTION, SOLUTION INTRAMUSCULAR; INTRAVENOUS; SUBCUTANEOUS at 18:35

## 2021-06-25 RX ADMIN — INSULIN LISPRO 2 UNITS: 100 INJECTION, SOLUTION INTRAVENOUS; SUBCUTANEOUS at 18:07

## 2021-06-25 RX ADMIN — SODIUM CHLORIDE, PRESERVATIVE FREE 10 ML: 5 INJECTION INTRAVENOUS at 09:23

## 2021-06-25 RX ADMIN — GABAPENTIN 100 MG: 100 CAPSULE ORAL at 05:27

## 2021-06-25 RX ADMIN — LORAZEPAM 1 MG: 2 INJECTION INTRAMUSCULAR; INTRAVENOUS at 10:08

## 2021-06-25 RX ADMIN — QUETIAPINE FUMARATE 200 MG: 200 TABLET, EXTENDED RELEASE ORAL at 21:56

## 2021-06-25 RX ADMIN — GABAPENTIN 100 MG: 100 CAPSULE ORAL at 15:39

## 2021-06-25 RX ADMIN — IPRATROPIUM BROMIDE AND ALBUTEROL SULFATE 3 ML: 2.5; .5 SOLUTION RESPIRATORY (INHALATION) at 07:30

## 2021-06-25 RX ADMIN — ENOXAPARIN SODIUM 40 MG: 40 INJECTION SUBCUTANEOUS at 18:07

## 2021-06-25 RX ADMIN — LISINOPRIL 10 MG: 10 TABLET ORAL at 09:23

## 2021-06-25 RX ADMIN — DEXTROMETHORPHAN HYDROBROMIDE AND QUINIDINE SULFATE 1 CAPSULE: 20; 10 CAPSULE, GELATIN COATED ORAL at 09:23

## 2021-06-25 RX ADMIN — HYDROXYZINE HYDROCHLORIDE 25 MG: 25 TABLET, FILM COATED ORAL at 07:07

## 2021-06-25 RX ADMIN — AMPICILLIN SODIUM 2 G: 2 INJECTION, POWDER, FOR SOLUTION INTRAVENOUS at 21:56

## 2021-06-25 RX ADMIN — DOCUSATE SODIUM 50 MG AND SENNOSIDES 8.6 MG 1 TABLET: 8.6; 5 TABLET, FILM COATED ORAL at 21:56

## 2021-06-25 RX ADMIN — CEFTRIAXONE SODIUM 2 G: 2 INJECTION, POWDER, FOR SOLUTION INTRAMUSCULAR; INTRAVENOUS at 11:57

## 2021-06-25 RX ADMIN — CEFTRIAXONE SODIUM 2 G: 2 INJECTION, POWDER, FOR SOLUTION INTRAMUSCULAR; INTRAVENOUS at 23:43

## 2021-06-25 RX ADMIN — SODIUM CHLORIDE 100 ML/HR: 9 INJECTION, SOLUTION INTRAVENOUS at 15:54

## 2021-06-25 RX ADMIN — LAMOTRIGINE 100 MG: 100 TABLET ORAL at 09:23

## 2021-06-25 RX ADMIN — AMPICILLIN SODIUM 2 G: 2 INJECTION, POWDER, FOR SOLUTION INTRAVENOUS at 17:01

## 2021-06-25 RX ADMIN — ATORVASTATIN CALCIUM 20 MG: 10 TABLET, FILM COATED ORAL at 09:22

## 2021-06-25 RX ADMIN — IPRATROPIUM BROMIDE AND ALBUTEROL SULFATE 3 ML: 2.5; .5 SOLUTION RESPIRATORY (INHALATION) at 18:30

## 2021-06-25 RX ADMIN — AMPICILLIN SODIUM 2 G: 2 INJECTION, POWDER, FOR SOLUTION INTRAVENOUS at 00:20

## 2021-06-25 RX ADMIN — AMPICILLIN SODIUM 2 G: 2 INJECTION, POWDER, FOR SOLUTION INTRAVENOUS at 09:22

## 2021-06-25 RX ADMIN — METOPROLOL TARTRATE 25 MG: 25 TABLET, FILM COATED ORAL at 09:22

## 2021-06-25 RX ADMIN — AMPICILLIN SODIUM 2 G: 2 INJECTION, POWDER, FOR SOLUTION INTRAVENOUS at 05:27

## 2021-06-25 RX ADMIN — OXYCODONE HYDROCHLORIDE AND ACETAMINOPHEN 1 TABLET: 7.5; 325 TABLET ORAL at 13:01

## 2021-06-25 RX ADMIN — HYDROXYZINE HYDROCHLORIDE 25 MG: 25 TABLET, FILM COATED ORAL at 00:20

## 2021-06-25 RX ADMIN — GABAPENTIN 100 MG: 100 CAPSULE ORAL at 21:56

## 2021-06-25 RX ADMIN — METOPROLOL TARTRATE 25 MG: 25 TABLET, FILM COATED ORAL at 21:56

## 2021-06-26 ENCOUNTER — APPOINTMENT (OUTPATIENT)
Dept: ULTRASOUND IMAGING | Facility: HOSPITAL | Age: 46
End: 2021-06-26

## 2021-06-26 LAB
ANION GAP SERPL CALCULATED.3IONS-SCNC: 9 MMOL/L (ref 5–15)
BACTERIA SPEC AEROBE CULT: NORMAL
BACTERIA SPEC AEROBE CULT: NORMAL
BASOPHILS # BLD AUTO: 0.1 10*3/MM3 (ref 0–0.2)
BASOPHILS NFR BLD AUTO: 1 % (ref 0–1.5)
BUN SERPL-MCNC: 13 MG/DL (ref 6–20)
BUN/CREAT SERPL: 6.7 (ref 7–25)
CALCIUM SPEC-SCNC: 11 MG/DL (ref 8.6–10.5)
CHLORIDE SERPL-SCNC: 103 MMOL/L (ref 98–107)
CO2 SERPL-SCNC: 26 MMOL/L (ref 22–29)
CREAT SERPL-MCNC: 1.93 MG/DL (ref 0.57–1)
DEPRECATED RDW RBC AUTO: 57.1 FL (ref 37–54)
EOSINOPHIL # BLD AUTO: 0 10*3/MM3 (ref 0–0.4)
EOSINOPHIL NFR BLD AUTO: 0 % (ref 0.3–6.2)
ERYTHROCYTE [DISTWIDTH] IN BLOOD BY AUTOMATED COUNT: 18.4 % (ref 12.3–15.4)
GFR SERPL CREATININE-BSD FRML MDRD: 28 ML/MIN/1.73
GLUCOSE BLDC GLUCOMTR-MCNC: 146 MG/DL (ref 70–130)
GLUCOSE BLDC GLUCOMTR-MCNC: 157 MG/DL (ref 70–130)
GLUCOSE BLDC GLUCOMTR-MCNC: 166 MG/DL (ref 70–130)
GLUCOSE BLDC GLUCOMTR-MCNC: 175 MG/DL (ref 70–130)
GLUCOSE SERPL-MCNC: 147 MG/DL (ref 65–99)
HCT VFR BLD AUTO: 30.2 % (ref 34–46.6)
HGB BLD-MCNC: 9.4 G/DL (ref 12–15.9)
IMM GRANULOCYTES # BLD AUTO: 0.04 10*3/MM3 (ref 0–0.05)
IMM GRANULOCYTES NFR BLD AUTO: 0.4 % (ref 0–0.5)
LYMPHOCYTES # BLD AUTO: 3.03 10*3/MM3 (ref 0.7–3.1)
LYMPHOCYTES NFR BLD AUTO: 30.2 % (ref 19.6–45.3)
MAGNESIUM SERPL-MCNC: 1.9 MG/DL (ref 1.6–2.6)
MCH RBC QN AUTO: 26.4 PG (ref 26.6–33)
MCHC RBC AUTO-ENTMCNC: 31.1 G/DL (ref 31.5–35.7)
MCV RBC AUTO: 84.8 FL (ref 79–97)
MONOCYTES # BLD AUTO: 0.94 10*3/MM3 (ref 0.1–0.9)
MONOCYTES NFR BLD AUTO: 9.4 % (ref 5–12)
NEUTROPHILS NFR BLD AUTO: 5.92 10*3/MM3 (ref 1.7–7)
NEUTROPHILS NFR BLD AUTO: 59 % (ref 42.7–76)
NRBC BLD AUTO-RTO: 0 /100 WBC (ref 0–0.2)
PHOSPHATE SERPL-MCNC: 4.8 MG/DL (ref 2.5–4.5)
PLATELET # BLD AUTO: 558 10*3/MM3 (ref 140–450)
PMV BLD AUTO: 9.9 FL (ref 6–12)
POTASSIUM SERPL-SCNC: 3.5 MMOL/L (ref 3.5–5.2)
PROT UR-MCNC: 137.2 MG/DL
PTH-INTACT SERPL-MCNC: 3.8 PG/ML (ref 15–65)
RBC # BLD AUTO: 3.56 10*6/MM3 (ref 3.77–5.28)
SODIUM SERPL-SCNC: 138 MMOL/L (ref 136–145)
SODIUM UR-SCNC: 37 MMOL/L
URATE SERPL-MCNC: 7.2 MG/DL (ref 2.4–5.7)
WBC # BLD AUTO: 10.03 10*3/MM3 (ref 3.4–10.8)

## 2021-06-26 PROCEDURE — 83735 ASSAY OF MAGNESIUM: CPT | Performed by: INTERNAL MEDICINE

## 2021-06-26 PROCEDURE — 25010000003 HYDROMORPHONE 1 MG/ML SOLUTION: Performed by: FAMILY MEDICINE

## 2021-06-26 PROCEDURE — 82784 ASSAY IGA/IGD/IGG/IGM EACH: CPT | Performed by: INTERNAL MEDICINE

## 2021-06-26 PROCEDURE — 84550 ASSAY OF BLOOD/URIC ACID: CPT | Performed by: INTERNAL MEDICINE

## 2021-06-26 PROCEDURE — 84100 ASSAY OF PHOSPHORUS: CPT | Performed by: INTERNAL MEDICINE

## 2021-06-26 PROCEDURE — 82962 GLUCOSE BLOOD TEST: CPT

## 2021-06-26 PROCEDURE — 25010000002 CEFTRIAXONE PER 250 MG: Performed by: INTERNAL MEDICINE

## 2021-06-26 PROCEDURE — 83970 ASSAY OF PARATHORMONE: CPT | Performed by: INTERNAL MEDICINE

## 2021-06-26 PROCEDURE — 94799 UNLISTED PULMONARY SVC/PX: CPT

## 2021-06-26 PROCEDURE — 25010000002 ENOXAPARIN PER 10 MG: Performed by: FAMILY MEDICINE

## 2021-06-26 PROCEDURE — 82570 ASSAY OF URINE CREATININE: CPT | Performed by: INTERNAL MEDICINE

## 2021-06-26 PROCEDURE — 86334 IMMUNOFIX E-PHORESIS SERUM: CPT | Performed by: INTERNAL MEDICINE

## 2021-06-26 PROCEDURE — 84156 ASSAY OF PROTEIN URINE: CPT | Performed by: INTERNAL MEDICINE

## 2021-06-26 PROCEDURE — 85025 COMPLETE CBC W/AUTO DIFF WBC: CPT | Performed by: FAMILY MEDICINE

## 2021-06-26 PROCEDURE — 80048 BASIC METABOLIC PNL TOTAL CA: CPT | Performed by: FAMILY MEDICINE

## 2021-06-26 PROCEDURE — 82043 UR ALBUMIN QUANTITATIVE: CPT | Performed by: INTERNAL MEDICINE

## 2021-06-26 PROCEDURE — 25010000002 AMPICILLIN PER 500 MG: Performed by: INTERNAL MEDICINE

## 2021-06-26 PROCEDURE — 84300 ASSAY OF URINE SODIUM: CPT | Performed by: INTERNAL MEDICINE

## 2021-06-26 PROCEDURE — 63710000001 INSULIN LISPRO (HUMAN) PER 5 UNITS: Performed by: NURSE PRACTITIONER

## 2021-06-26 PROCEDURE — 76775 US EXAM ABDO BACK WALL LIM: CPT

## 2021-06-26 RX ADMIN — AMPICILLIN SODIUM 2 G: 2 INJECTION, POWDER, FOR SOLUTION INTRAVENOUS at 21:00

## 2021-06-26 RX ADMIN — ENOXAPARIN SODIUM 40 MG: 40 INJECTION SUBCUTANEOUS at 17:37

## 2021-06-26 RX ADMIN — AMPICILLIN SODIUM 2 G: 2 INJECTION, POWDER, FOR SOLUTION INTRAVENOUS at 01:47

## 2021-06-26 RX ADMIN — GABAPENTIN 100 MG: 100 CAPSULE ORAL at 13:16

## 2021-06-26 RX ADMIN — GABAPENTIN 100 MG: 100 CAPSULE ORAL at 06:01

## 2021-06-26 RX ADMIN — METOPROLOL TARTRATE 25 MG: 25 TABLET, FILM COATED ORAL at 10:04

## 2021-06-26 RX ADMIN — DOCUSATE SODIUM 50 MG AND SENNOSIDES 8.6 MG 1 TABLET: 8.6; 5 TABLET, FILM COATED ORAL at 21:00

## 2021-06-26 RX ADMIN — INSULIN LISPRO 2 UNITS: 100 INJECTION, SOLUTION INTRAVENOUS; SUBCUTANEOUS at 17:37

## 2021-06-26 RX ADMIN — AMPICILLIN SODIUM 2 G: 2 INJECTION, POWDER, FOR SOLUTION INTRAVENOUS at 04:21

## 2021-06-26 RX ADMIN — DEXTROMETHORPHAN HYDROBROMIDE AND QUINIDINE SULFATE 1 CAPSULE: 20; 10 CAPSULE, GELATIN COATED ORAL at 10:03

## 2021-06-26 RX ADMIN — GABAPENTIN 100 MG: 100 CAPSULE ORAL at 21:01

## 2021-06-26 RX ADMIN — ATORVASTATIN CALCIUM 20 MG: 10 TABLET, FILM COATED ORAL at 10:03

## 2021-06-26 RX ADMIN — IPRATROPIUM BROMIDE AND ALBUTEROL SULFATE 3 ML: 2.5; .5 SOLUTION RESPIRATORY (INHALATION) at 18:25

## 2021-06-26 RX ADMIN — CEFTRIAXONE SODIUM 2 G: 2 INJECTION, POWDER, FOR SOLUTION INTRAMUSCULAR; INTRAVENOUS at 11:07

## 2021-06-26 RX ADMIN — HYDROXYZINE HYDROCHLORIDE 25 MG: 25 TABLET, FILM COATED ORAL at 13:16

## 2021-06-26 RX ADMIN — AMPICILLIN SODIUM 2 G: 2 INJECTION, POWDER, FOR SOLUTION INTRAVENOUS at 12:54

## 2021-06-26 RX ADMIN — METOPROLOL TARTRATE 25 MG: 25 TABLET, FILM COATED ORAL at 21:00

## 2021-06-26 RX ADMIN — DOCUSATE SODIUM 50 MG AND SENNOSIDES 8.6 MG 1 TABLET: 8.6; 5 TABLET, FILM COATED ORAL at 10:03

## 2021-06-26 RX ADMIN — IPRATROPIUM BROMIDE AND ALBUTEROL SULFATE 3 ML: 2.5; .5 SOLUTION RESPIRATORY (INHALATION) at 11:32

## 2021-06-26 RX ADMIN — AMPICILLIN SODIUM 2 G: 2 INJECTION, POWDER, FOR SOLUTION INTRAVENOUS at 17:32

## 2021-06-26 RX ADMIN — HYDROMORPHONE HYDROCHLORIDE 1 MG: 1 INJECTION, SOLUTION INTRAMUSCULAR; INTRAVENOUS; SUBCUTANEOUS at 13:26

## 2021-06-26 RX ADMIN — QUETIAPINE FUMARATE 200 MG: 200 TABLET, EXTENDED RELEASE ORAL at 21:00

## 2021-06-26 RX ADMIN — LAMOTRIGINE 100 MG: 100 TABLET ORAL at 10:03

## 2021-06-26 RX ADMIN — OXYCODONE HYDROCHLORIDE AND ACETAMINOPHEN 1 TABLET: 7.5; 325 TABLET ORAL at 10:06

## 2021-06-26 RX ADMIN — IPRATROPIUM BROMIDE AND ALBUTEROL SULFATE 3 ML: 2.5; .5 SOLUTION RESPIRATORY (INHALATION) at 00:02

## 2021-06-26 RX ADMIN — SODIUM CHLORIDE, PRESERVATIVE FREE 10 ML: 5 INJECTION INTRAVENOUS at 10:06

## 2021-06-26 RX ADMIN — AMPICILLIN SODIUM 2 G: 2 INJECTION, POWDER, FOR SOLUTION INTRAVENOUS at 10:02

## 2021-06-26 RX ADMIN — IPRATROPIUM BROMIDE AND ALBUTEROL SULFATE 3 ML: 2.5; .5 SOLUTION RESPIRATORY (INHALATION) at 06:27

## 2021-06-27 ENCOUNTER — APPOINTMENT (OUTPATIENT)
Dept: GENERAL RADIOLOGY | Facility: HOSPITAL | Age: 46
End: 2021-06-27

## 2021-06-27 PROBLEM — E87.70 FLUID OVERLOAD: Status: ACTIVE | Noted: 2021-06-27

## 2021-06-27 PROBLEM — N17.9 AKI (ACUTE KIDNEY INJURY) (HCC): Status: ACTIVE | Noted: 2021-06-27

## 2021-06-27 LAB
ALBUMIN SERPL-MCNC: 2.2 G/DL (ref 3.5–5.2)
ALBUMIN/GLOB SERPL: 0.6 G/DL
ALP SERPL-CCNC: 414 U/L (ref 39–117)
ALT SERPL W P-5'-P-CCNC: 14 U/L (ref 1–33)
ANION GAP SERPL CALCULATED.3IONS-SCNC: 11 MMOL/L (ref 5–15)
ARTERIAL PATENCY WRIST A: POSITIVE
AST SERPL-CCNC: 18 U/L (ref 1–32)
ATMOSPHERIC PRESS: 750 MMHG
BASE EXCESS BLDA CALC-SCNC: 0.3 MMOL/L (ref 0–2)
BASOPHILS # BLD AUTO: 0.12 10*3/MM3 (ref 0–0.2)
BASOPHILS NFR BLD AUTO: 1.2 % (ref 0–1.5)
BDY SITE: ABNORMAL
BILIRUB SERPL-MCNC: 0.2 MG/DL (ref 0–1.2)
BODY TEMPERATURE: 37 C
BUN SERPL-MCNC: 16 MG/DL (ref 6–20)
BUN/CREAT SERPL: 6 (ref 7–25)
CALCIUM SPEC-SCNC: 10.5 MG/DL (ref 8.6–10.5)
CHLORIDE SERPL-SCNC: 104 MMOL/L (ref 98–107)
CO2 SERPL-SCNC: 25 MMOL/L (ref 22–29)
CREAT SERPL-MCNC: 2.65 MG/DL (ref 0.57–1)
DEPRECATED RDW RBC AUTO: 57.4 FL (ref 37–54)
EOSINOPHIL # BLD AUTO: 0 10*3/MM3 (ref 0–0.4)
EOSINOPHIL NFR BLD AUTO: 0 % (ref 0.3–6.2)
ERYTHROCYTE [DISTWIDTH] IN BLOOD BY AUTOMATED COUNT: 18.6 % (ref 12.3–15.4)
GAS FLOW AIRWAY: 4.5 LPM
GFR SERPL CREATININE-BSD FRML MDRD: 20 ML/MIN/1.73
GLOBULIN UR ELPH-MCNC: 3.6 GM/DL
GLUCOSE BLDC GLUCOMTR-MCNC: 136 MG/DL (ref 70–130)
GLUCOSE BLDC GLUCOMTR-MCNC: 158 MG/DL (ref 70–130)
GLUCOSE BLDC GLUCOMTR-MCNC: 161 MG/DL (ref 70–130)
GLUCOSE BLDC GLUCOMTR-MCNC: 179 MG/DL (ref 70–130)
GLUCOSE SERPL-MCNC: 160 MG/DL (ref 65–99)
HCG SERPL QL: NEGATIVE
HCO3 BLDA-SCNC: 26.2 MMOL/L (ref 20–26)
HCT VFR BLD AUTO: 27.8 % (ref 34–46.6)
HGB BLD-MCNC: 8.8 G/DL (ref 12–15.9)
IMM GRANULOCYTES # BLD AUTO: 0.04 10*3/MM3 (ref 0–0.05)
IMM GRANULOCYTES NFR BLD AUTO: 0.4 % (ref 0–0.5)
LYMPHOCYTES # BLD AUTO: 2.77 10*3/MM3 (ref 0.7–3.1)
LYMPHOCYTES NFR BLD AUTO: 26.6 % (ref 19.6–45.3)
Lab: ABNORMAL
MCH RBC QN AUTO: 26.7 PG (ref 26.6–33)
MCHC RBC AUTO-ENTMCNC: 31.7 G/DL (ref 31.5–35.7)
MCV RBC AUTO: 84.5 FL (ref 79–97)
MODALITY: ABNORMAL
MONOCYTES # BLD AUTO: 0.8 10*3/MM3 (ref 0.1–0.9)
MONOCYTES NFR BLD AUTO: 7.7 % (ref 5–12)
NEUTROPHILS NFR BLD AUTO: 6.67 10*3/MM3 (ref 1.7–7)
NEUTROPHILS NFR BLD AUTO: 64.1 % (ref 42.7–76)
NRBC BLD AUTO-RTO: 0 /100 WBC (ref 0–0.2)
PCO2 BLDA: 47.8 MM HG (ref 35–45)
PCO2 TEMP ADJ BLD: 47.8 MM HG (ref 35–45)
PH BLDA: 7.35 PH UNITS (ref 7.35–7.45)
PH, TEMP CORRECTED: 7.35 PH UNITS (ref 7.35–7.45)
PLATELET # BLD AUTO: 512 10*3/MM3 (ref 140–450)
PMV BLD AUTO: 9.6 FL (ref 6–12)
PO2 BLDA: 83.8 MM HG (ref 83–108)
PO2 TEMP ADJ BLD: 83.8 MM HG (ref 83–108)
POTASSIUM SERPL-SCNC: 3.3 MMOL/L (ref 3.5–5.2)
PROT SERPL-MCNC: 5.8 G/DL (ref 6–8.5)
RBC # BLD AUTO: 3.29 10*6/MM3 (ref 3.77–5.28)
SAO2 % BLDCOA: 96.5 % (ref 94–99)
SARS-COV-2 RNA PNL SPEC NAA+PROBE: NOT DETECTED
SODIUM SERPL-SCNC: 140 MMOL/L (ref 136–145)
VENTILATOR MODE: ABNORMAL
WBC # BLD AUTO: 10.4 10*3/MM3 (ref 3.4–10.8)

## 2021-06-27 PROCEDURE — 25010000002 AMPICILLIN PER 500 MG: Performed by: INTERNAL MEDICINE

## 2021-06-27 PROCEDURE — 97110 THERAPEUTIC EXERCISES: CPT

## 2021-06-27 PROCEDURE — 36600 WITHDRAWAL OF ARTERIAL BLOOD: CPT

## 2021-06-27 PROCEDURE — 84703 CHORIONIC GONADOTROPIN ASSAY: CPT | Performed by: SURGERY

## 2021-06-27 PROCEDURE — 99232 SBSQ HOSP IP/OBS MODERATE 35: CPT | Performed by: SURGERY

## 2021-06-27 PROCEDURE — 80053 COMPREHEN METABOLIC PANEL: CPT | Performed by: INTERNAL MEDICINE

## 2021-06-27 PROCEDURE — 25010000002 ENOXAPARIN PER 10 MG: Performed by: FAMILY MEDICINE

## 2021-06-27 PROCEDURE — 85025 COMPLETE CBC W/AUTO DIFF WBC: CPT | Performed by: INTERNAL MEDICINE

## 2021-06-27 PROCEDURE — 82803 BLOOD GASES ANY COMBINATION: CPT

## 2021-06-27 PROCEDURE — 25010000002 CEFTRIAXONE PER 250 MG: Performed by: INTERNAL MEDICINE

## 2021-06-27 PROCEDURE — 94799 UNLISTED PULMONARY SVC/PX: CPT

## 2021-06-27 PROCEDURE — 71045 X-RAY EXAM CHEST 1 VIEW: CPT

## 2021-06-27 PROCEDURE — 25010000003 HYDROMORPHONE 1 MG/ML SOLUTION: Performed by: FAMILY MEDICINE

## 2021-06-27 PROCEDURE — 63710000001 INSULIN LISPRO (HUMAN) PER 5 UNITS: Performed by: NURSE PRACTITIONER

## 2021-06-27 PROCEDURE — 87635 SARS-COV-2 COVID-19 AMP PRB: CPT | Performed by: SURGERY

## 2021-06-27 PROCEDURE — 25010000002 FUROSEMIDE PER 20 MG: Performed by: INTERNAL MEDICINE

## 2021-06-27 PROCEDURE — 97116 GAIT TRAINING THERAPY: CPT

## 2021-06-27 PROCEDURE — 94660 CPAP INITIATION&MGMT: CPT

## 2021-06-27 PROCEDURE — 25010000002 LORAZEPAM PER 2 MG: Performed by: FAMILY MEDICINE

## 2021-06-27 PROCEDURE — 82962 GLUCOSE BLOOD TEST: CPT

## 2021-06-27 PROCEDURE — 25010000002 ONDANSETRON PER 1 MG: Performed by: NURSE PRACTITIONER

## 2021-06-27 RX ORDER — FUROSEMIDE 10 MG/ML
20 INJECTION INTRAMUSCULAR; INTRAVENOUS ONCE
Status: COMPLETED | OUTPATIENT
Start: 2021-06-27 | End: 2021-06-27

## 2021-06-27 RX ORDER — FUROSEMIDE 10 MG/ML
40 INJECTION INTRAMUSCULAR; INTRAVENOUS 3 TIMES DAILY
Status: DISCONTINUED | OUTPATIENT
Start: 2021-06-27 | End: 2021-06-27

## 2021-06-27 RX ORDER — METOLAZONE 2.5 MG/1
2.5 TABLET ORAL DAILY
Status: DISCONTINUED | OUTPATIENT
Start: 2021-06-27 | End: 2021-06-28 | Stop reason: SDUPTHER

## 2021-06-27 RX ADMIN — DOCUSATE SODIUM 50 MG AND SENNOSIDES 8.6 MG 1 TABLET: 8.6; 5 TABLET, FILM COATED ORAL at 08:54

## 2021-06-27 RX ADMIN — LORAZEPAM 1 MG: 2 INJECTION INTRAMUSCULAR; INTRAVENOUS at 15:01

## 2021-06-27 RX ADMIN — INSULIN LISPRO 2 UNITS: 100 INJECTION, SOLUTION INTRAVENOUS; SUBCUTANEOUS at 21:43

## 2021-06-27 RX ADMIN — FUROSEMIDE 20 MG: 10 INJECTION, SOLUTION INTRAVENOUS at 06:47

## 2021-06-27 RX ADMIN — IPRATROPIUM BROMIDE AND ALBUTEROL SULFATE 3 ML: 2.5; .5 SOLUTION RESPIRATORY (INHALATION) at 06:21

## 2021-06-27 RX ADMIN — OXYCODONE HYDROCHLORIDE AND ACETAMINOPHEN 1 TABLET: 7.5; 325 TABLET ORAL at 09:07

## 2021-06-27 RX ADMIN — ONDANSETRON HYDROCHLORIDE 4 MG: 2 SOLUTION INTRAMUSCULAR; INTRAVENOUS at 20:08

## 2021-06-27 RX ADMIN — SODIUM CHLORIDE, PRESERVATIVE FREE 10 ML: 5 INJECTION INTRAVENOUS at 21:29

## 2021-06-27 RX ADMIN — IPRATROPIUM BROMIDE AND ALBUTEROL SULFATE 3 ML: 2.5; .5 SOLUTION RESPIRATORY (INHALATION) at 14:49

## 2021-06-27 RX ADMIN — LORAZEPAM 1 MG: 2 INJECTION INTRAMUSCULAR; INTRAVENOUS at 20:14

## 2021-06-27 RX ADMIN — HYDROXYZINE HYDROCHLORIDE 25 MG: 25 TABLET, FILM COATED ORAL at 00:27

## 2021-06-27 RX ADMIN — INSULIN LISPRO 2 UNITS: 100 INJECTION, SOLUTION INTRAVENOUS; SUBCUTANEOUS at 12:57

## 2021-06-27 RX ADMIN — FUROSEMIDE 20 MG/HR: 10 INJECTION, SOLUTION INTRAVENOUS at 17:59

## 2021-06-27 RX ADMIN — AMPICILLIN SODIUM 2 G: 2 INJECTION, POWDER, FOR SOLUTION INTRAVENOUS at 01:45

## 2021-06-27 RX ADMIN — DEXTROMETHORPHAN HYDROBROMIDE AND QUINIDINE SULFATE 1 CAPSULE: 20; 10 CAPSULE, GELATIN COATED ORAL at 08:53

## 2021-06-27 RX ADMIN — GABAPENTIN 100 MG: 100 CAPSULE ORAL at 05:05

## 2021-06-27 RX ADMIN — LAMOTRIGINE 100 MG: 100 TABLET ORAL at 08:54

## 2021-06-27 RX ADMIN — CEFTRIAXONE SODIUM 2 G: 2 INJECTION, POWDER, FOR SOLUTION INTRAMUSCULAR; INTRAVENOUS at 00:25

## 2021-06-27 RX ADMIN — IPRATROPIUM BROMIDE AND ALBUTEROL SULFATE 3 ML: 2.5; .5 SOLUTION RESPIRATORY (INHALATION) at 00:01

## 2021-06-27 RX ADMIN — SODIUM CHLORIDE, PRESERVATIVE FREE 10 ML: 5 INJECTION INTRAVENOUS at 08:54

## 2021-06-27 RX ADMIN — METOPROLOL TARTRATE 25 MG: 25 TABLET, FILM COATED ORAL at 08:53

## 2021-06-27 RX ADMIN — CEFTRIAXONE SODIUM 2 G: 2 INJECTION, POWDER, FOR SOLUTION INTRAMUSCULAR; INTRAVENOUS at 23:21

## 2021-06-27 RX ADMIN — FUROSEMIDE 20 MG/HR: 10 INJECTION, SOLUTION INTRAVENOUS at 23:43

## 2021-06-27 RX ADMIN — SODIUM CHLORIDE 75 ML/HR: 9 INJECTION, SOLUTION INTRAVENOUS at 04:42

## 2021-06-27 RX ADMIN — ATORVASTATIN CALCIUM 20 MG: 10 TABLET, FILM COATED ORAL at 08:54

## 2021-06-27 RX ADMIN — AMPICILLIN SODIUM 2 G: 2 INJECTION, POWDER, FOR SOLUTION INTRAVENOUS at 08:54

## 2021-06-27 RX ADMIN — LORAZEPAM 1 MG: 2 INJECTION INTRAMUSCULAR; INTRAVENOUS at 06:17

## 2021-06-27 RX ADMIN — CEFTRIAXONE SODIUM 2 G: 2 INJECTION, POWDER, FOR SOLUTION INTRAMUSCULAR; INTRAVENOUS at 11:12

## 2021-06-27 RX ADMIN — FUROSEMIDE 40 MG: 10 INJECTION, SOLUTION INTRAMUSCULAR; INTRAVENOUS at 15:18

## 2021-06-27 RX ADMIN — HYDROXYZINE HYDROCHLORIDE 25 MG: 25 TABLET, FILM COATED ORAL at 13:56

## 2021-06-27 RX ADMIN — ENOXAPARIN SODIUM 40 MG: 40 INJECTION SUBCUTANEOUS at 18:01

## 2021-06-27 RX ADMIN — HYDROMORPHONE HYDROCHLORIDE 1 MG: 1 INJECTION, SOLUTION INTRAMUSCULAR; INTRAVENOUS; SUBCUTANEOUS at 23:26

## 2021-06-27 RX ADMIN — AMPICILLIN SODIUM 2 G: 2 INJECTION, POWDER, FOR SOLUTION INTRAVENOUS at 19:43

## 2021-06-27 RX ADMIN — AMPICILLIN SODIUM 2 G: 2 INJECTION, POWDER, FOR SOLUTION INTRAVENOUS at 05:05

## 2021-06-27 RX ADMIN — AMPICILLIN SODIUM 2 G: 2 INJECTION, POWDER, FOR SOLUTION INTRAVENOUS at 12:57

## 2021-06-27 RX ADMIN — IPRATROPIUM BROMIDE AND ALBUTEROL SULFATE 3 ML: 2.5; .5 SOLUTION RESPIRATORY (INHALATION) at 20:06

## 2021-06-28 ENCOUNTER — ANESTHESIA EVENT (OUTPATIENT)
Dept: PERIOP | Facility: HOSPITAL | Age: 46
End: 2021-06-28

## 2021-06-28 ENCOUNTER — APPOINTMENT (OUTPATIENT)
Dept: GENERAL RADIOLOGY | Facility: HOSPITAL | Age: 46
End: 2021-06-28

## 2021-06-28 ENCOUNTER — APPOINTMENT (OUTPATIENT)
Dept: ULTRASOUND IMAGING | Facility: HOSPITAL | Age: 46
End: 2021-06-28

## 2021-06-28 ENCOUNTER — APPOINTMENT (OUTPATIENT)
Dept: CARDIOLOGY | Facility: HOSPITAL | Age: 46
End: 2021-06-28

## 2021-06-28 ENCOUNTER — ANESTHESIA (OUTPATIENT)
Dept: PERIOP | Facility: HOSPITAL | Age: 46
End: 2021-06-28

## 2021-06-28 ENCOUNTER — APPOINTMENT (OUTPATIENT)
Dept: INTERVENTIONAL RADIOLOGY/VASCULAR | Facility: HOSPITAL | Age: 46
End: 2021-06-28

## 2021-06-28 PROBLEM — J96.01 ACUTE RESPIRATORY FAILURE WITH HYPOXIA (HCC): Status: ACTIVE | Noted: 2021-06-28

## 2021-06-28 PROBLEM — I33.0 ACUTE BACTERIAL ENDOCARDITIS: Status: ACTIVE | Noted: 2021-06-21

## 2021-06-28 PROBLEM — D63.8 ANEMIA, CHRONIC DISEASE: Status: ACTIVE | Noted: 2021-06-28

## 2021-06-28 PROBLEM — I33.0 AORTIC VALVE VEGETATION: Status: ACTIVE | Noted: 2021-06-28

## 2021-06-28 PROBLEM — B95.2 ENTEROCOCCAL BACTEREMIA: Status: ACTIVE | Noted: 2021-06-21

## 2021-06-28 PROBLEM — J90 BILATERAL PLEURAL EFFUSION: Status: ACTIVE | Noted: 2021-06-28

## 2021-06-28 PROBLEM — R78.81 ENTEROCOCCAL BACTEREMIA: Status: ACTIVE | Noted: 2021-06-21

## 2021-06-28 PROBLEM — Z86.73 HISTORY OF CEREBROVASCULAR ACCIDENT: Status: ACTIVE | Noted: 2021-06-28

## 2021-06-28 LAB
ALBUMIN UR-MCNC: 16.6 MG/DL
ANION GAP SERPL CALCULATED.3IONS-SCNC: 16 MMOL/L (ref 5–15)
ARTERIAL PATENCY WRIST A: POSITIVE
ATMOSPHERIC PRESS: 753 MMHG
BASE EXCESS BLDA CALC-SCNC: -1.8 MMOL/L (ref 0–2)
BASOPHILS # BLD AUTO: 0.14 10*3/MM3 (ref 0–0.2)
BASOPHILS NFR BLD AUTO: 1 % (ref 0–1.5)
BDY SITE: ABNORMAL
BH CV ECHO MEAS - AO MAX PG (FULL): 8.7 MMHG
BH CV ECHO MEAS - AO MAX PG: 18.7 MMHG
BH CV ECHO MEAS - AO MEAN PG (FULL): 5 MMHG
BH CV ECHO MEAS - AO MEAN PG: 10 MMHG
BH CV ECHO MEAS - AO ROOT AREA (BSA CORRECTED): 1.5
BH CV ECHO MEAS - AO ROOT AREA: 5.7 CM^2
BH CV ECHO MEAS - AO ROOT DIAM: 2.7 CM
BH CV ECHO MEAS - AO V2 MAX: 216 CM/SEC
BH CV ECHO MEAS - AO V2 MEAN: 141 CM/SEC
BH CV ECHO MEAS - AO V2 VTI: 46.6 CM
BH CV ECHO MEAS - AVA(I,A): 1.8 CM^2
BH CV ECHO MEAS - AVA(I,D): 1.8 CM^2
BH CV ECHO MEAS - AVA(V,A): 1.9 CM^2
BH CV ECHO MEAS - AVA(V,D): 1.9 CM^2
BH CV ECHO MEAS - BSA(HAYCOCK): 1.9 M^2
BH CV ECHO MEAS - BSA: 1.9 M^2
BH CV ECHO MEAS - BZI_BMI: 28.6 KILOGRAMS/M^2
BH CV ECHO MEAS - BZI_METRIC_HEIGHT: 165.1 CM
BH CV ECHO MEAS - BZI_METRIC_WEIGHT: 78 KG
BH CV ECHO MEAS - EDV(MOD-SP4): 66.2 ML
BH CV ECHO MEAS - EF(MOD-SP4): 64.8 %
BH CV ECHO MEAS - ESV(MOD-SP4): 23.3 ML
BH CV ECHO MEAS - LV DIASTOLIC VOL/BSA (35-75): 35.7 ML/M^2
BH CV ECHO MEAS - LV MAX PG: 10 MMHG
BH CV ECHO MEAS - LV MEAN PG: 5 MMHG
BH CV ECHO MEAS - LV SYSTOLIC VOL/BSA (12-30): 12.6 ML/M^2
BH CV ECHO MEAS - LV V1 MAX: 158 CM/SEC
BH CV ECHO MEAS - LV V1 MEAN: 98.8 CM/SEC
BH CV ECHO MEAS - LV V1 VTI: 32.1 CM
BH CV ECHO MEAS - LVLD AP4: 7 CM
BH CV ECHO MEAS - LVLS AP4: 5.5 CM
BH CV ECHO MEAS - LVOT AREA (M): 2.5 CM^2
BH CV ECHO MEAS - LVOT AREA: 2.5 CM^2
BH CV ECHO MEAS - LVOT DIAM: 1.8 CM
BH CV ECHO MEAS - MV A MAX VEL: 73.3 CM/SEC
BH CV ECHO MEAS - MV DEC TIME: 0.13 SEC
BH CV ECHO MEAS - MV E MAX VEL: 131 CM/SEC
BH CV ECHO MEAS - MV E/A: 1.8
BH CV ECHO MEAS - RAP SYSTOLE: 5 MMHG
BH CV ECHO MEAS - RVSP: 52.1 MMHG
BH CV ECHO MEAS - SI(AO): 143.8 ML/M^2
BH CV ECHO MEAS - SI(LVOT): 44 ML/M^2
BH CV ECHO MEAS - SI(MOD-SP4): 23.1 ML/M^2
BH CV ECHO MEAS - SV(AO): 266.8 ML
BH CV ECHO MEAS - SV(LVOT): 81.7 ML
BH CV ECHO MEAS - SV(MOD-SP4): 42.9 ML
BH CV ECHO MEAS - TR MAX VEL: 343 CM/SEC
BODY TEMPERATURE: 37 C
BUN SERPL-MCNC: 19 MG/DL (ref 6–20)
BUN/CREAT SERPL: 5.6 (ref 7–25)
CALCIUM SPEC-SCNC: 10.2 MG/DL (ref 8.6–10.5)
CHLORIDE SERPL-SCNC: 104 MMOL/L (ref 98–107)
CO2 SERPL-SCNC: 20 MMOL/L (ref 22–29)
CREAT SERPL-MCNC: 3.37 MG/DL (ref 0.57–1)
CREAT UR-MCNC: 103.9 MG/DL
CREAT UR-MCNC: 103.9 MG/DL
DEPRECATED RDW RBC AUTO: 59.7 FL (ref 37–54)
EOSINOPHIL # BLD AUTO: 0 10*3/MM3 (ref 0–0.4)
EOSINOPHIL NFR BLD AUTO: 0 % (ref 0.3–6.2)
ERYTHROCYTE [DISTWIDTH] IN BLOOD BY AUTOMATED COUNT: 18.9 % (ref 12.3–15.4)
GAS FLOW AIRWAY: 2 LPM
GFR SERPL CREATININE-BSD FRML MDRD: 15 ML/MIN/1.73
GLUCOSE BLDC GLUCOMTR-MCNC: 125 MG/DL (ref 70–130)
GLUCOSE BLDC GLUCOMTR-MCNC: 134 MG/DL (ref 70–130)
GLUCOSE BLDC GLUCOMTR-MCNC: 142 MG/DL (ref 70–130)
GLUCOSE SERPL-MCNC: 111 MG/DL (ref 65–99)
HCO3 BLDA-SCNC: 24.8 MMOL/L (ref 20–26)
HCT VFR BLD AUTO: 31.6 % (ref 34–46.6)
HGB BLD-MCNC: 9.9 G/DL (ref 12–15.9)
IMM GRANULOCYTES # BLD AUTO: 0.13 10*3/MM3 (ref 0–0.05)
IMM GRANULOCYTES NFR BLD AUTO: 0.9 % (ref 0–0.5)
LYMPHOCYTES # BLD AUTO: 3.5 10*3/MM3 (ref 0.7–3.1)
LYMPHOCYTES NFR BLD AUTO: 25.3 % (ref 19.6–45.3)
Lab: ABNORMAL
MAXIMAL PREDICTED HEART RATE: 176 BPM
MCH RBC QN AUTO: 27 PG (ref 26.6–33)
MCHC RBC AUTO-ENTMCNC: 31.3 G/DL (ref 31.5–35.7)
MCV RBC AUTO: 86.1 FL (ref 79–97)
MICROALBUMIN/CREAT UR: 159.8 MG/G
MODALITY: ABNORMAL
MONOCYTES # BLD AUTO: 1 10*3/MM3 (ref 0.1–0.9)
MONOCYTES NFR BLD AUTO: 7.2 % (ref 5–12)
NEUTROPHILS NFR BLD AUTO: 65.6 % (ref 42.7–76)
NEUTROPHILS NFR BLD AUTO: 9.06 10*3/MM3 (ref 1.7–7)
NRBC BLD AUTO-RTO: 0 /100 WBC (ref 0–0.2)
PCO2 BLDA: 50 MM HG (ref 35–45)
PCO2 TEMP ADJ BLD: 50 MM HG (ref 35–45)
PH BLDA: 7.3 PH UNITS (ref 7.35–7.45)
PH, TEMP CORRECTED: 7.3 PH UNITS (ref 7.35–7.45)
PLATELET # BLD AUTO: 530 10*3/MM3 (ref 140–450)
PMV BLD AUTO: 10.2 FL (ref 6–12)
PO2 BLDA: 63.7 MM HG (ref 83–108)
PO2 TEMP ADJ BLD: 63.7 MM HG (ref 83–108)
POTASSIUM SERPL-SCNC: 4.9 MMOL/L (ref 3.5–5.2)
QT INTERVAL: 366 MS
QTC INTERVAL: 467 MS
RBC # BLD AUTO: 3.67 10*6/MM3 (ref 3.77–5.28)
SAO2 % BLDCOA: 90.2 % (ref 94–99)
SODIUM SERPL-SCNC: 140 MMOL/L (ref 136–145)
STRESS TARGET HR: 150 BPM
VENTILATOR MODE: ABNORMAL
WBC # BLD AUTO: 13.83 10*3/MM3 (ref 3.4–10.8)

## 2021-06-28 PROCEDURE — 94799 UNLISTED PULMONARY SVC/PX: CPT

## 2021-06-28 PROCEDURE — 25010000002 FUROSEMIDE PER 20 MG: Performed by: INTERNAL MEDICINE

## 2021-06-28 PROCEDURE — 25010000002 AMPICILLIN PER 500 MG: Performed by: INTERNAL MEDICINE

## 2021-06-28 PROCEDURE — 25010000003 HYDROMORPHONE 1 MG/ML SOLUTION: Performed by: FAMILY MEDICINE

## 2021-06-28 PROCEDURE — 71045 X-RAY EXAM CHEST 1 VIEW: CPT

## 2021-06-28 PROCEDURE — 99232 SBSQ HOSP IP/OBS MODERATE 35: CPT | Performed by: NURSE PRACTITIONER

## 2021-06-28 PROCEDURE — 93321 DOPPLER ECHO F-UP/LMTD STD: CPT

## 2021-06-28 PROCEDURE — 25010000003 HYDROMORPHONE 1 MG/ML SOLUTION: Performed by: SURGERY

## 2021-06-28 PROCEDURE — 99232 SBSQ HOSP IP/OBS MODERATE 35: CPT | Performed by: SURGERY

## 2021-06-28 PROCEDURE — 93005 ELECTROCARDIOGRAM TRACING: CPT | Performed by: INTERNAL MEDICINE

## 2021-06-28 PROCEDURE — 25010000002 ONDANSETRON PER 1 MG: Performed by: SURGERY

## 2021-06-28 PROCEDURE — 0W9930Z DRAINAGE OF RIGHT PLEURAL CAVITY WITH DRAINAGE DEVICE, PERCUTANEOUS APPROACH: ICD-10-PCS | Performed by: SURGERY

## 2021-06-28 PROCEDURE — 25010000002 ENOXAPARIN PER 10 MG: Performed by: FAMILY MEDICINE

## 2021-06-28 PROCEDURE — 93321 DOPPLER ECHO F-UP/LMTD STD: CPT | Performed by: INTERNAL MEDICINE

## 2021-06-28 PROCEDURE — 93975 VASCULAR STUDY: CPT

## 2021-06-28 PROCEDURE — 99232 SBSQ HOSP IP/OBS MODERATE 35: CPT | Performed by: PSYCHIATRY & NEUROLOGY

## 2021-06-28 PROCEDURE — 82962 GLUCOSE BLOOD TEST: CPT

## 2021-06-28 PROCEDURE — 80048 BASIC METABOLIC PNL TOTAL CA: CPT | Performed by: FAMILY MEDICINE

## 2021-06-28 PROCEDURE — 93325 DOPPLER ECHO COLOR FLOW MAPG: CPT | Performed by: INTERNAL MEDICINE

## 2021-06-28 PROCEDURE — 82803 BLOOD GASES ANY COMBINATION: CPT

## 2021-06-28 PROCEDURE — 93325 DOPPLER ECHO COLOR FLOW MAPG: CPT

## 2021-06-28 PROCEDURE — 25010000002 LORAZEPAM PER 2 MG: Performed by: SURGERY

## 2021-06-28 PROCEDURE — 94660 CPAP INITIATION&MGMT: CPT

## 2021-06-28 PROCEDURE — 93308 TTE F-UP OR LMTD: CPT

## 2021-06-28 PROCEDURE — 85025 COMPLETE CBC W/AUTO DIFF WBC: CPT | Performed by: INTERNAL MEDICINE

## 2021-06-28 PROCEDURE — G0463 HOSPITAL OUTPT CLINIC VISIT: HCPCS | Performed by: SURGERY

## 2021-06-28 PROCEDURE — 93308 TTE F-UP OR LMTD: CPT | Performed by: INTERNAL MEDICINE

## 2021-06-28 PROCEDURE — 93010 ELECTROCARDIOGRAM REPORT: CPT | Performed by: INTERNAL MEDICINE

## 2021-06-28 PROCEDURE — 25010000002 LORAZEPAM PER 2 MG: Performed by: FAMILY MEDICINE

## 2021-06-28 PROCEDURE — 36600 WITHDRAWAL OF ARTERIAL BLOOD: CPT

## 2021-06-28 RX ORDER — SODIUM CHLORIDE 0.9 % (FLUSH) 0.9 %
10 SYRINGE (ML) INJECTION EVERY 12 HOURS SCHEDULED
Status: DISCONTINUED | OUTPATIENT
Start: 2021-06-28 | End: 2021-07-02 | Stop reason: HOSPADM

## 2021-06-28 RX ORDER — AMOXICILLIN 250 MG
1 CAPSULE ORAL 2 TIMES DAILY
Status: DISCONTINUED | OUTPATIENT
Start: 2021-06-28 | End: 2021-07-02 | Stop reason: HOSPADM

## 2021-06-28 RX ORDER — HYDROXYZINE HYDROCHLORIDE 25 MG/1
25 TABLET, FILM COATED ORAL EVERY 6 HOURS PRN
Status: DISCONTINUED | OUTPATIENT
Start: 2021-06-28 | End: 2021-07-01

## 2021-06-28 RX ORDER — ACETAMINOPHEN 325 MG/1
650 TABLET ORAL EVERY 4 HOURS PRN
Status: DISCONTINUED | OUTPATIENT
Start: 2021-06-28 | End: 2021-07-01

## 2021-06-28 RX ORDER — ATORVASTATIN CALCIUM 10 MG/1
20 TABLET, FILM COATED ORAL DAILY
Status: DISCONTINUED | OUTPATIENT
Start: 2021-06-28 | End: 2021-06-28

## 2021-06-28 RX ORDER — ACETAMINOPHEN 160 MG/5ML
650 SOLUTION ORAL EVERY 4 HOURS PRN
Status: DISCONTINUED | OUTPATIENT
Start: 2021-06-28 | End: 2021-07-01

## 2021-06-28 RX ORDER — ATORVASTATIN CALCIUM 10 MG/1
20 TABLET, FILM COATED ORAL DAILY
Status: DISCONTINUED | OUTPATIENT
Start: 2021-06-28 | End: 2021-07-01

## 2021-06-28 RX ORDER — BUPIVACAINE HCL/0.9 % NACL/PF 0.1 %
2 PLASTIC BAG, INJECTION (ML) EPIDURAL ONCE
Status: DISCONTINUED | OUTPATIENT
Start: 2021-06-28 | End: 2021-06-28 | Stop reason: HOSPADM

## 2021-06-28 RX ORDER — QUETIAPINE 200 MG/1
200 TABLET, FILM COATED, EXTENDED RELEASE ORAL NIGHTLY
Status: DISCONTINUED | OUTPATIENT
Start: 2021-06-28 | End: 2021-07-02 | Stop reason: HOSPADM

## 2021-06-28 RX ORDER — METOLAZONE 2.5 MG/1
2.5 TABLET ORAL DAILY
Status: DISCONTINUED | OUTPATIENT
Start: 2021-06-28 | End: 2021-06-28

## 2021-06-28 RX ORDER — ONDANSETRON 4 MG/1
4 TABLET, FILM COATED ORAL EVERY 6 HOURS PRN
Status: DISCONTINUED | OUTPATIENT
Start: 2021-06-28 | End: 2021-07-01

## 2021-06-28 RX ORDER — METOLAZONE 2.5 MG/1
2.5 TABLET ORAL DAILY
Status: DISCONTINUED | OUTPATIENT
Start: 2021-06-28 | End: 2021-06-29

## 2021-06-28 RX ORDER — ACETAMINOPHEN 650 MG/1
650 SUPPOSITORY RECTAL EVERY 4 HOURS PRN
Status: DISCONTINUED | OUTPATIENT
Start: 2021-06-28 | End: 2021-07-01

## 2021-06-28 RX ORDER — ONDANSETRON 2 MG/ML
4 INJECTION INTRAMUSCULAR; INTRAVENOUS EVERY 6 HOURS PRN
Status: DISCONTINUED | OUTPATIENT
Start: 2021-06-28 | End: 2021-07-01

## 2021-06-28 RX ORDER — OXYCODONE AND ACETAMINOPHEN 7.5; 325 MG/1; MG/1
1 TABLET ORAL EVERY 4 HOURS PRN
Status: DISCONTINUED | OUTPATIENT
Start: 2021-06-28 | End: 2021-07-01

## 2021-06-28 RX ORDER — DEXTROSE MONOHYDRATE 25 G/50ML
25 INJECTION, SOLUTION INTRAVENOUS
Status: DISCONTINUED | OUTPATIENT
Start: 2021-06-28 | End: 2021-07-01

## 2021-06-28 RX ORDER — LORAZEPAM 2 MG/ML
1 INJECTION INTRAMUSCULAR EVERY 4 HOURS PRN
Status: DISCONTINUED | OUTPATIENT
Start: 2021-06-28 | End: 2021-07-01

## 2021-06-28 RX ORDER — ACETAMINOPHEN 650 MG/1
650 SUPPOSITORY RECTAL EVERY 4 HOURS PRN
COMMUNITY

## 2021-06-28 RX ORDER — ACETAMINOPHEN 325 MG/1
650 TABLET ORAL EVERY 4 HOURS PRN
COMMUNITY

## 2021-06-28 RX ORDER — INSULIN GLARGINE 100 [IU]/ML
20 INJECTION, SOLUTION SUBCUTANEOUS
COMMUNITY
End: 2021-07-02 | Stop reason: HOSPADM

## 2021-06-28 RX ORDER — IPRATROPIUM BROMIDE AND ALBUTEROL SULFATE 2.5; .5 MG/3ML; MG/3ML
3 SOLUTION RESPIRATORY (INHALATION)
Status: DISCONTINUED | OUTPATIENT
Start: 2021-06-28 | End: 2021-07-01

## 2021-06-28 RX ORDER — ASPIRIN 81 MG/1
81 TABLET ORAL DAILY
COMMUNITY
End: 2021-07-02 | Stop reason: HOSPADM

## 2021-06-28 RX ORDER — SODIUM CHLORIDE 0.9 % (FLUSH) 0.9 %
10 SYRINGE (ML) INJECTION AS NEEDED
Status: DISCONTINUED | OUTPATIENT
Start: 2021-06-28 | End: 2021-07-02 | Stop reason: HOSPADM

## 2021-06-28 RX ORDER — OLANZAPINE 5 MG/1
5 TABLET ORAL DAILY
COMMUNITY
End: 2021-07-02 | Stop reason: HOSPADM

## 2021-06-28 RX ORDER — NICOTINE POLACRILEX 4 MG
15 LOZENGE BUCCAL
Status: DISCONTINUED | OUTPATIENT
Start: 2021-06-28 | End: 2021-07-01

## 2021-06-28 RX ORDER — TRAMADOL HYDROCHLORIDE 50 MG/1
50 TABLET ORAL EVERY 8 HOURS PRN
Status: DISCONTINUED | OUTPATIENT
Start: 2021-06-28 | End: 2021-07-01

## 2021-06-28 RX ORDER — IPRATROPIUM BROMIDE AND ALBUTEROL SULFATE 2.5; .5 MG/3ML; MG/3ML
3 SOLUTION RESPIRATORY (INHALATION)
Status: DISCONTINUED | OUTPATIENT
Start: 2021-06-28 | End: 2021-06-28

## 2021-06-28 RX ADMIN — METOPROLOL TARTRATE 25 MG: 25 TABLET, FILM COATED ORAL at 20:13

## 2021-06-28 RX ADMIN — ONDANSETRON 4 MG: 2 INJECTION INTRAMUSCULAR; INTRAVENOUS at 19:17

## 2021-06-28 RX ADMIN — DOCUSATE SODIUM 50 MG AND SENNOSIDES 8.6 MG 1 TABLET: 8.6; 5 TABLET, FILM COATED ORAL at 20:13

## 2021-06-28 RX ADMIN — FUROSEMIDE 20 MG/HR: 10 INJECTION, SOLUTION INTRAVENOUS at 07:44

## 2021-06-28 RX ADMIN — AMPICILLIN SODIUM 2 G: 2 INJECTION, POWDER, FOR SOLUTION INTRAVENOUS at 18:32

## 2021-06-28 RX ADMIN — HYDROMORPHONE HYDROCHLORIDE 1 MG: 1 INJECTION, SOLUTION INTRAMUSCULAR; INTRAVENOUS; SUBCUTANEOUS at 07:20

## 2021-06-28 RX ADMIN — HYDROMORPHONE HYDROCHLORIDE 1 MG: 1 INJECTION, SOLUTION INTRAMUSCULAR; INTRAVENOUS; SUBCUTANEOUS at 20:20

## 2021-06-28 RX ADMIN — IPRATROPIUM BROMIDE AND ALBUTEROL SULFATE 3 ML: 2.5; .5 SOLUTION RESPIRATORY (INHALATION) at 13:01

## 2021-06-28 RX ADMIN — GABAPENTIN 100 MG: 100 CAPSULE ORAL at 22:57

## 2021-06-28 RX ADMIN — SODIUM CHLORIDE, PRESERVATIVE FREE 10 ML: 5 INJECTION INTRAVENOUS at 15:04

## 2021-06-28 RX ADMIN — METOLAZONE 2.5 MG: 2.5 TABLET ORAL at 13:51

## 2021-06-28 RX ADMIN — IPRATROPIUM BROMIDE AND ALBUTEROL SULFATE 3 ML: 2.5; .5 SOLUTION RESPIRATORY (INHALATION) at 18:37

## 2021-06-28 RX ADMIN — GABAPENTIN 100 MG: 100 CAPSULE ORAL at 14:07

## 2021-06-28 RX ADMIN — IPRATROPIUM BROMIDE AND ALBUTEROL SULFATE 3 ML: 2.5; .5 SOLUTION RESPIRATORY (INHALATION) at 00:01

## 2021-06-28 RX ADMIN — HYDROMORPHONE HYDROCHLORIDE 1 MG: 1 INJECTION, SOLUTION INTRAMUSCULAR; INTRAVENOUS; SUBCUTANEOUS at 22:57

## 2021-06-28 RX ADMIN — AMPICILLIN SODIUM 2 G: 2 INJECTION, POWDER, FOR SOLUTION INTRAVENOUS at 00:57

## 2021-06-28 RX ADMIN — HYDROMORPHONE HYDROCHLORIDE 1 MG: 1 INJECTION, SOLUTION INTRAMUSCULAR; INTRAVENOUS; SUBCUTANEOUS at 15:07

## 2021-06-28 RX ADMIN — FUROSEMIDE 20 MG/HR: 10 INJECTION, SOLUTION INTRAVENOUS at 20:53

## 2021-06-28 RX ADMIN — LORAZEPAM 1 MG: 2 INJECTION INTRAMUSCULAR; INTRAVENOUS at 06:37

## 2021-06-28 RX ADMIN — ATORVASTATIN CALCIUM 20 MG: 10 TABLET, FILM COATED ORAL at 13:51

## 2021-06-28 RX ADMIN — QUETIAPINE FUMARATE 200 MG: 200 TABLET, EXTENDED RELEASE ORAL at 20:13

## 2021-06-28 RX ADMIN — LORAZEPAM 1 MG: 2 INJECTION INTRAMUSCULAR; INTRAVENOUS at 14:07

## 2021-06-28 RX ADMIN — LAMOTRIGINE 100 MG: 100 TABLET ORAL at 12:36

## 2021-06-28 RX ADMIN — DEXTROMETHORPHAN HYDROBROMIDE AND QUINIDINE SULFATE 1 CAPSULE: 20; 10 CAPSULE, GELATIN COATED ORAL at 13:52

## 2021-06-28 RX ADMIN — LORAZEPAM 1 MG: 2 INJECTION INTRAMUSCULAR; INTRAVENOUS at 23:30

## 2021-06-28 RX ADMIN — IPRATROPIUM BROMIDE AND ALBUTEROL SULFATE 3 ML: 2.5; .5 SOLUTION RESPIRATORY (INHALATION) at 07:21

## 2021-06-28 RX ADMIN — ENOXAPARIN SODIUM 30 MG: 30 INJECTION SUBCUTANEOUS at 18:32

## 2021-06-28 RX ADMIN — AMPICILLIN SODIUM 2 G: 2 INJECTION, POWDER, FOR SOLUTION INTRAVENOUS at 08:22

## 2021-06-29 ENCOUNTER — APPOINTMENT (OUTPATIENT)
Dept: GENERAL RADIOLOGY | Facility: HOSPITAL | Age: 46
End: 2021-06-29

## 2021-06-29 PROBLEM — I35.1 AORTIC VALVE REGURGITATION: Status: ACTIVE | Noted: 2021-06-29

## 2021-06-29 PROBLEM — I34.0 MITRAL VALVE REGURGITATION: Status: ACTIVE | Noted: 2021-06-29

## 2021-06-29 LAB
ANION GAP SERPL CALCULATED.3IONS-SCNC: 18 MMOL/L (ref 5–15)
BUN SERPL-MCNC: 23 MG/DL (ref 6–20)
BUN/CREAT SERPL: 5.9 (ref 7–25)
CALCIUM SPEC-SCNC: 10.5 MG/DL (ref 8.6–10.5)
CHLORIDE SERPL-SCNC: 101 MMOL/L (ref 98–107)
CO2 SERPL-SCNC: 23 MMOL/L (ref 22–29)
CREAT SERPL-MCNC: 3.93 MG/DL (ref 0.57–1)
GFR SERPL CREATININE-BSD FRML MDRD: 12 ML/MIN/1.73
GFR SERPL CREATININE-BSD FRML MDRD: ABNORMAL ML/MIN/{1.73_M2}
GLUCOSE BLDC GLUCOMTR-MCNC: 127 MG/DL (ref 70–130)
GLUCOSE BLDC GLUCOMTR-MCNC: 136 MG/DL (ref 70–130)
GLUCOSE BLDC GLUCOMTR-MCNC: 139 MG/DL (ref 70–130)
GLUCOSE BLDC GLUCOMTR-MCNC: 168 MG/DL (ref 70–130)
GLUCOSE SERPL-MCNC: 154 MG/DL (ref 65–99)
IGA SERPL-MCNC: 209 MG/DL (ref 87–352)
IGG SERPL-MCNC: 1694 MG/DL (ref 586–1602)
IGM SERPL-MCNC: 147 MG/DL (ref 26–217)
POTASSIUM SERPL-SCNC: 3.6 MMOL/L (ref 3.5–5.2)
PROT PATTERN SERPL IFE-IMP: ABNORMAL
SODIUM SERPL-SCNC: 142 MMOL/L (ref 136–145)

## 2021-06-29 PROCEDURE — 99233 SBSQ HOSP IP/OBS HIGH 50: CPT | Performed by: PSYCHIATRY & NEUROLOGY

## 2021-06-29 PROCEDURE — 74018 RADEX ABDOMEN 1 VIEW: CPT

## 2021-06-29 PROCEDURE — 94799 UNLISTED PULMONARY SVC/PX: CPT

## 2021-06-29 PROCEDURE — 25010000002 FUROSEMIDE PER 20 MG: Performed by: INTERNAL MEDICINE

## 2021-06-29 PROCEDURE — 87205 SMEAR GRAM STAIN: CPT | Performed by: INTERNAL MEDICINE

## 2021-06-29 PROCEDURE — 25010000002 ONDANSETRON PER 1 MG: Performed by: SURGERY

## 2021-06-29 PROCEDURE — 25010000003 HYDROMORPHONE 1 MG/ML SOLUTION: Performed by: SURGERY

## 2021-06-29 PROCEDURE — 25010000002 ENOXAPARIN PER 10 MG: Performed by: FAMILY MEDICINE

## 2021-06-29 PROCEDURE — 63710000001 INSULIN LISPRO (HUMAN) PER 5 UNITS: Performed by: SURGERY

## 2021-06-29 PROCEDURE — 99233 SBSQ HOSP IP/OBS HIGH 50: CPT | Performed by: NURSE PRACTITIONER

## 2021-06-29 PROCEDURE — 80048 BASIC METABOLIC PNL TOTAL CA: CPT | Performed by: FAMILY MEDICINE

## 2021-06-29 PROCEDURE — 82962 GLUCOSE BLOOD TEST: CPT

## 2021-06-29 PROCEDURE — 71045 X-RAY EXAM CHEST 1 VIEW: CPT

## 2021-06-29 PROCEDURE — 97530 THERAPEUTIC ACTIVITIES: CPT

## 2021-06-29 PROCEDURE — 25010000002 CEFTRIAXONE PER 250 MG: Performed by: INTERNAL MEDICINE

## 2021-06-29 PROCEDURE — 25010000002 AMPICILLIN PER 500 MG: Performed by: INTERNAL MEDICINE

## 2021-06-29 PROCEDURE — 92610 EVALUATE SWALLOWING FUNCTION: CPT | Performed by: SPEECH-LANGUAGE PATHOLOGIST

## 2021-06-29 PROCEDURE — 94660 CPAP INITIATION&MGMT: CPT

## 2021-06-29 PROCEDURE — 99222 1ST HOSP IP/OBS MODERATE 55: CPT | Performed by: CLINICAL NURSE SPECIALIST

## 2021-06-29 RX ORDER — FUROSEMIDE 10 MG/ML
40 INJECTION INTRAMUSCULAR; INTRAVENOUS EVERY 12 HOURS
Status: DISCONTINUED | OUTPATIENT
Start: 2021-06-29 | End: 2021-06-30

## 2021-06-29 RX ORDER — NOREPINEPHRINE BIT/0.9 % NACL 8 MG/250ML
.02-.3 INFUSION BOTTLE (ML) INTRAVENOUS
Status: DISCONTINUED | OUTPATIENT
Start: 2021-06-29 | End: 2021-07-01

## 2021-06-29 RX ADMIN — DOCUSATE SODIUM 50 MG AND SENNOSIDES 8.6 MG 1 TABLET: 8.6; 5 TABLET, FILM COATED ORAL at 12:02

## 2021-06-29 RX ADMIN — IPRATROPIUM BROMIDE AND ALBUTEROL SULFATE 3 ML: 2.5; .5 SOLUTION RESPIRATORY (INHALATION) at 13:31

## 2021-06-29 RX ADMIN — LAMOTRIGINE 100 MG: 100 TABLET ORAL at 12:02

## 2021-06-29 RX ADMIN — HYDROMORPHONE HYDROCHLORIDE 1 MG: 1 INJECTION, SOLUTION INTRAMUSCULAR; INTRAVENOUS; SUBCUTANEOUS at 01:49

## 2021-06-29 RX ADMIN — ATORVASTATIN CALCIUM 20 MG: 10 TABLET, FILM COATED ORAL at 12:01

## 2021-06-29 RX ADMIN — SODIUM CHLORIDE 250 ML: 9 INJECTION, SOLUTION INTRAVENOUS at 19:50

## 2021-06-29 RX ADMIN — FUROSEMIDE 20 MG/HR: 10 INJECTION, SOLUTION INTRAVENOUS at 04:50

## 2021-06-29 RX ADMIN — SODIUM CHLORIDE, PRESERVATIVE FREE 10 ML: 5 INJECTION INTRAVENOUS at 20:35

## 2021-06-29 RX ADMIN — IPRATROPIUM BROMIDE AND ALBUTEROL SULFATE 3 ML: 2.5; .5 SOLUTION RESPIRATORY (INHALATION) at 00:55

## 2021-06-29 RX ADMIN — AMPICILLIN SODIUM 2 G: 2 INJECTION, POWDER, FOR SOLUTION INTRAVENOUS at 16:45

## 2021-06-29 RX ADMIN — METOLAZONE 2.5 MG: 2.5 TABLET ORAL at 12:02

## 2021-06-29 RX ADMIN — SODIUM CHLORIDE, PRESERVATIVE FREE 10 ML: 5 INJECTION INTRAVENOUS at 09:26

## 2021-06-29 RX ADMIN — CEFTRIAXONE SODIUM 2 G: 2 INJECTION, POWDER, FOR SOLUTION INTRAMUSCULAR; INTRAVENOUS at 12:01

## 2021-06-29 RX ADMIN — FUROSEMIDE 20 MG/HR: 10 INJECTION, SOLUTION INTRAVENOUS at 10:02

## 2021-06-29 RX ADMIN — AMPICILLIN SODIUM 2 G: 2 INJECTION, POWDER, FOR SOLUTION INTRAVENOUS at 09:25

## 2021-06-29 RX ADMIN — IPRATROPIUM BROMIDE AND ALBUTEROL SULFATE 3 ML: 2.5; .5 SOLUTION RESPIRATORY (INHALATION) at 06:15

## 2021-06-29 RX ADMIN — FUROSEMIDE 40 MG: 10 INJECTION, SOLUTION INTRAVENOUS at 16:45

## 2021-06-29 RX ADMIN — ENOXAPARIN SODIUM 30 MG: 30 INJECTION SUBCUTANEOUS at 16:45

## 2021-06-29 RX ADMIN — METOPROLOL TARTRATE 25 MG: 25 TABLET, FILM COATED ORAL at 12:02

## 2021-06-29 RX ADMIN — IPRATROPIUM BROMIDE AND ALBUTEROL SULFATE 3 ML: 2.5; .5 SOLUTION RESPIRATORY (INHALATION) at 19:05

## 2021-06-29 RX ADMIN — ONDANSETRON 4 MG: 2 INJECTION INTRAMUSCULAR; INTRAVENOUS at 09:38

## 2021-06-29 RX ADMIN — INSULIN LISPRO 2 UNITS: 100 INJECTION, SOLUTION INTRAVENOUS; SUBCUTANEOUS at 17:07

## 2021-06-29 RX ADMIN — GABAPENTIN 100 MG: 100 CAPSULE ORAL at 14:14

## 2021-06-29 RX ADMIN — HYDROMORPHONE HYDROCHLORIDE 1 MG: 1 INJECTION, SOLUTION INTRAMUSCULAR; INTRAVENOUS; SUBCUTANEOUS at 04:05

## 2021-06-29 RX ADMIN — AMPICILLIN SODIUM 2 G: 2 INJECTION, POWDER, FOR SOLUTION INTRAVENOUS at 00:52

## 2021-06-29 RX ADMIN — Medication 0.02 MCG/KG/MIN: at 20:33

## 2021-06-30 ENCOUNTER — APPOINTMENT (OUTPATIENT)
Dept: GENERAL RADIOLOGY | Facility: HOSPITAL | Age: 46
End: 2021-06-30

## 2021-06-30 LAB
ANION GAP SERPL CALCULATED.3IONS-SCNC: 15 MMOL/L (ref 5–15)
BUN SERPL-MCNC: 27 MG/DL (ref 6–20)
BUN/CREAT SERPL: 6.3 (ref 7–25)
CALCIUM SPEC-SCNC: 10 MG/DL (ref 8.6–10.5)
CHLORIDE SERPL-SCNC: 104 MMOL/L (ref 98–107)
CO2 SERPL-SCNC: 24 MMOL/L (ref 22–29)
CREAT SERPL-MCNC: 4.31 MG/DL (ref 0.57–1)
DEPRECATED RDW RBC AUTO: 58.4 FL (ref 37–54)
EOSINOPHIL SPEC QL MICRO: 0 % EOS/100 CELLS (ref 0–0)
ERYTHROCYTE [DISTWIDTH] IN BLOOD BY AUTOMATED COUNT: 19 % (ref 12.3–15.4)
GFR SERPL CREATININE-BSD FRML MDRD: 11 ML/MIN/1.73
GFR SERPL CREATININE-BSD FRML MDRD: ABNORMAL ML/MIN/{1.73_M2}
GLUCOSE BLDC GLUCOMTR-MCNC: 121 MG/DL (ref 70–130)
GLUCOSE BLDC GLUCOMTR-MCNC: 135 MG/DL (ref 70–130)
GLUCOSE BLDC GLUCOMTR-MCNC: 141 MG/DL (ref 70–130)
GLUCOSE BLDC GLUCOMTR-MCNC: 227 MG/DL (ref 70–130)
GLUCOSE SERPL-MCNC: 144 MG/DL (ref 65–99)
HCT VFR BLD AUTO: 30.7 % (ref 34–46.6)
HGB BLD-MCNC: 9.7 G/DL (ref 12–15.9)
MAGNESIUM SERPL-MCNC: 1.9 MG/DL (ref 1.6–2.6)
MCH RBC QN AUTO: 26.5 PG (ref 26.6–33)
MCHC RBC AUTO-ENTMCNC: 31.6 G/DL (ref 31.5–35.7)
MCV RBC AUTO: 83.9 FL (ref 79–97)
PHOSPHATE SERPL-MCNC: 5.2 MG/DL (ref 2.5–4.5)
PLATELET # BLD AUTO: 568 10*3/MM3 (ref 140–450)
PMV BLD AUTO: 10.7 FL (ref 6–12)
POTASSIUM SERPL-SCNC: 3.1 MMOL/L (ref 3.5–5.2)
RBC # BLD AUTO: 3.66 10*6/MM3 (ref 3.77–5.28)
SODIUM SERPL-SCNC: 143 MMOL/L (ref 136–145)
WBC # BLD AUTO: 11.34 10*3/MM3 (ref 3.4–10.8)

## 2021-06-30 PROCEDURE — 71045 X-RAY EXAM CHEST 1 VIEW: CPT

## 2021-06-30 PROCEDURE — 25010000002 ENOXAPARIN PER 10 MG: Performed by: FAMILY MEDICINE

## 2021-06-30 PROCEDURE — 63710000001 INSULIN LISPRO (HUMAN) PER 5 UNITS: Performed by: SURGERY

## 2021-06-30 PROCEDURE — 99233 SBSQ HOSP IP/OBS HIGH 50: CPT | Performed by: PSYCHIATRY & NEUROLOGY

## 2021-06-30 PROCEDURE — 0HDNXZZ EXTRACTION OF LEFT FOOT SKIN, EXTERNAL APPROACH: ICD-10-PCS | Performed by: NURSE PRACTITIONER

## 2021-06-30 PROCEDURE — 25010000002 AMPICILLIN PER 500 MG: Performed by: INTERNAL MEDICINE

## 2021-06-30 PROCEDURE — 80048 BASIC METABOLIC PNL TOTAL CA: CPT | Performed by: INTERNAL MEDICINE

## 2021-06-30 PROCEDURE — 99232 SBSQ HOSP IP/OBS MODERATE 35: CPT | Performed by: NURSE PRACTITIONER

## 2021-06-30 PROCEDURE — 94799 UNLISTED PULMONARY SVC/PX: CPT

## 2021-06-30 PROCEDURE — 99232 SBSQ HOSP IP/OBS MODERATE 35: CPT | Performed by: SURGERY

## 2021-06-30 PROCEDURE — 99232 SBSQ HOSP IP/OBS MODERATE 35: CPT | Performed by: CLINICAL NURSE SPECIALIST

## 2021-06-30 PROCEDURE — 85027 COMPLETE CBC AUTOMATED: CPT | Performed by: INTERNAL MEDICINE

## 2021-06-30 PROCEDURE — 25010000002 ONDANSETRON PER 1 MG: Performed by: SURGERY

## 2021-06-30 PROCEDURE — 25010000002 CEFTRIAXONE PER 250 MG: Performed by: INTERNAL MEDICINE

## 2021-06-30 PROCEDURE — 83735 ASSAY OF MAGNESIUM: CPT | Performed by: INTERNAL MEDICINE

## 2021-06-30 PROCEDURE — 92526 ORAL FUNCTION THERAPY: CPT | Performed by: SPEECH-LANGUAGE PATHOLOGIST

## 2021-06-30 PROCEDURE — 99497 ADVNCD CARE PLAN 30 MIN: CPT | Performed by: CLINICAL NURSE SPECIALIST

## 2021-06-30 PROCEDURE — 84100 ASSAY OF PHOSPHORUS: CPT | Performed by: INTERNAL MEDICINE

## 2021-06-30 PROCEDURE — 25010000003 HYDROMORPHONE 1 MG/ML SOLUTION: Performed by: SURGERY

## 2021-06-30 PROCEDURE — 97116 GAIT TRAINING THERAPY: CPT

## 2021-06-30 PROCEDURE — 97530 THERAPEUTIC ACTIVITIES: CPT

## 2021-06-30 PROCEDURE — 82962 GLUCOSE BLOOD TEST: CPT

## 2021-06-30 RX ORDER — LIDOCAINE 40 MG/G
CREAM TOPICAL ONCE
Status: COMPLETED | OUTPATIENT
Start: 2021-06-30 | End: 2021-06-30

## 2021-06-30 RX ORDER — POTASSIUM CHLORIDE 20MEQ/15ML
40 LIQUID (ML) ORAL ONCE
Status: COMPLETED | OUTPATIENT
Start: 2021-06-30 | End: 2021-06-30

## 2021-06-30 RX ADMIN — HYDROMORPHONE HYDROCHLORIDE 1 MG: 1 INJECTION, SOLUTION INTRAMUSCULAR; INTRAVENOUS; SUBCUTANEOUS at 03:09

## 2021-06-30 RX ADMIN — LAMOTRIGINE 100 MG: 100 TABLET ORAL at 08:43

## 2021-06-30 RX ADMIN — ATORVASTATIN CALCIUM 20 MG: 10 TABLET, FILM COATED ORAL at 08:43

## 2021-06-30 RX ADMIN — ONDANSETRON 4 MG: 2 INJECTION INTRAMUSCULAR; INTRAVENOUS at 09:53

## 2021-06-30 RX ADMIN — DOCUSATE SODIUM 50 MG AND SENNOSIDES 8.6 MG 1 TABLET: 8.6; 5 TABLET, FILM COATED ORAL at 20:37

## 2021-06-30 RX ADMIN — AMPICILLIN SODIUM 2 G: 2 INJECTION, POWDER, FOR SOLUTION INTRAVENOUS at 02:06

## 2021-06-30 RX ADMIN — AMPICILLIN SODIUM 2 G: 2 INJECTION, POWDER, FOR SOLUTION INTRAVENOUS at 08:43

## 2021-06-30 RX ADMIN — DOCUSATE SODIUM 50 MG AND SENNOSIDES 8.6 MG 1 TABLET: 8.6; 5 TABLET, FILM COATED ORAL at 08:43

## 2021-06-30 RX ADMIN — ENOXAPARIN SODIUM 30 MG: 30 INJECTION SUBCUTANEOUS at 17:05

## 2021-06-30 RX ADMIN — CEFTRIAXONE SODIUM 2 G: 2 INJECTION, POWDER, FOR SOLUTION INTRAMUSCULAR; INTRAVENOUS at 12:07

## 2021-06-30 RX ADMIN — POTASSIUM CHLORIDE 40 MEQ: 20 SOLUTION ORAL at 12:07

## 2021-06-30 RX ADMIN — HYDROMORPHONE HYDROCHLORIDE 1 MG: 1 INJECTION, SOLUTION INTRAMUSCULAR; INTRAVENOUS; SUBCUTANEOUS at 14:00

## 2021-06-30 RX ADMIN — SODIUM CHLORIDE, PRESERVATIVE FREE 10 ML: 5 INJECTION INTRAVENOUS at 08:43

## 2021-06-30 RX ADMIN — IPRATROPIUM BROMIDE AND ALBUTEROL SULFATE 3 ML: 2.5; .5 SOLUTION RESPIRATORY (INHALATION) at 12:56

## 2021-06-30 RX ADMIN — AMPICILLIN SODIUM 2 G: 2 INJECTION, POWDER, FOR SOLUTION INTRAVENOUS at 17:06

## 2021-06-30 RX ADMIN — LIDOCAINE 4%: 4 CREAM TOPICAL at 14:04

## 2021-06-30 RX ADMIN — INSULIN LISPRO 4 UNITS: 100 INJECTION, SOLUTION INTRAVENOUS; SUBCUTANEOUS at 17:05

## 2021-06-30 RX ADMIN — IPRATROPIUM BROMIDE AND ALBUTEROL SULFATE 3 ML: 2.5; .5 SOLUTION RESPIRATORY (INHALATION) at 06:32

## 2021-06-30 RX ADMIN — IPRATROPIUM BROMIDE AND ALBUTEROL SULFATE 3 ML: 2.5; .5 SOLUTION RESPIRATORY (INHALATION) at 19:28

## 2021-06-30 RX ADMIN — METOPROLOL TARTRATE 25 MG: 25 TABLET, FILM COATED ORAL at 20:37

## 2021-06-30 RX ADMIN — GABAPENTIN 100 MG: 100 CAPSULE ORAL at 14:05

## 2021-06-30 RX ADMIN — QUETIAPINE FUMARATE 200 MG: 200 TABLET, EXTENDED RELEASE ORAL at 20:37

## 2021-06-30 RX ADMIN — IPRATROPIUM BROMIDE AND ALBUTEROL SULFATE 3 ML: 2.5; .5 SOLUTION RESPIRATORY (INHALATION) at 00:13

## 2021-06-30 RX ADMIN — SODIUM CHLORIDE, PRESERVATIVE FREE 10 ML: 5 INJECTION INTRAVENOUS at 20:37

## 2021-07-01 ENCOUNTER — APPOINTMENT (OUTPATIENT)
Dept: GENERAL RADIOLOGY | Facility: HOSPITAL | Age: 46
End: 2021-07-01

## 2021-07-01 LAB
ALBUMIN SERPL-MCNC: 2.2 G/DL (ref 3.5–5.2)
ALBUMIN/GLOB SERPL: 0.5 G/DL
ALP SERPL-CCNC: 316 U/L (ref 39–117)
ALT SERPL W P-5'-P-CCNC: 10 U/L (ref 1–33)
ANION GAP SERPL CALCULATED.3IONS-SCNC: 15 MMOL/L (ref 5–15)
AST SERPL-CCNC: 16 U/L (ref 1–32)
BILIRUB SERPL-MCNC: 0.2 MG/DL (ref 0–1.2)
BUN SERPL-MCNC: 30 MG/DL (ref 6–20)
BUN/CREAT SERPL: 6.8 (ref 7–25)
CALCIUM SPEC-SCNC: 10.1 MG/DL (ref 8.6–10.5)
CHLORIDE SERPL-SCNC: 104 MMOL/L (ref 98–107)
CO2 SERPL-SCNC: 25 MMOL/L (ref 22–29)
CREAT SERPL-MCNC: 4.42 MG/DL (ref 0.57–1)
DEPRECATED RDW RBC AUTO: 59.7 FL (ref 37–54)
ERYTHROCYTE [DISTWIDTH] IN BLOOD BY AUTOMATED COUNT: 19.3 % (ref 12.3–15.4)
GFR SERPL CREATININE-BSD FRML MDRD: 11 ML/MIN/1.73
GFR SERPL CREATININE-BSD FRML MDRD: ABNORMAL ML/MIN/{1.73_M2}
GLOBULIN UR ELPH-MCNC: 4.2 GM/DL
GLUCOSE BLDC GLUCOMTR-MCNC: 118 MG/DL (ref 70–130)
GLUCOSE BLDC GLUCOMTR-MCNC: 142 MG/DL (ref 70–130)
GLUCOSE BLDC GLUCOMTR-MCNC: 161 MG/DL (ref 70–130)
GLUCOSE SERPL-MCNC: 141 MG/DL (ref 65–99)
HCT VFR BLD AUTO: 32.9 % (ref 34–46.6)
HGB BLD-MCNC: 10.3 G/DL (ref 12–15.9)
MAGNESIUM SERPL-MCNC: 2 MG/DL (ref 1.6–2.6)
MCH RBC QN AUTO: 26.4 PG (ref 26.6–33)
MCHC RBC AUTO-ENTMCNC: 31.3 G/DL (ref 31.5–35.7)
MCV RBC AUTO: 84.4 FL (ref 79–97)
PHOSPHATE SERPL-MCNC: 4 MG/DL (ref 2.5–4.5)
PLATELET # BLD AUTO: 570 10*3/MM3 (ref 140–450)
PMV BLD AUTO: 10.6 FL (ref 6–12)
POTASSIUM SERPL-SCNC: 3.3 MMOL/L (ref 3.5–5.2)
PROT SERPL-MCNC: 6.4 G/DL (ref 6–8.5)
RBC # BLD AUTO: 3.9 10*6/MM3 (ref 3.77–5.28)
SODIUM SERPL-SCNC: 144 MMOL/L (ref 136–145)
WBC # BLD AUTO: 9.63 10*3/MM3 (ref 3.4–10.8)

## 2021-07-01 PROCEDURE — 97110 THERAPEUTIC EXERCISES: CPT

## 2021-07-01 PROCEDURE — 85027 COMPLETE CBC AUTOMATED: CPT | Performed by: INTERNAL MEDICINE

## 2021-07-01 PROCEDURE — 94799 UNLISTED PULMONARY SVC/PX: CPT

## 2021-07-01 PROCEDURE — 63710000001 INSULIN LISPRO (HUMAN) PER 5 UNITS: Performed by: SURGERY

## 2021-07-01 PROCEDURE — 25010000002 LORAZEPAM PER 2 MG: Performed by: SURGERY

## 2021-07-01 PROCEDURE — 99233 SBSQ HOSP IP/OBS HIGH 50: CPT | Performed by: CLINICAL NURSE SPECIALIST

## 2021-07-01 PROCEDURE — 71045 X-RAY EXAM CHEST 1 VIEW: CPT

## 2021-07-01 PROCEDURE — 80053 COMPREHEN METABOLIC PANEL: CPT | Performed by: INTERNAL MEDICINE

## 2021-07-01 PROCEDURE — 99497 ADVNCD CARE PLAN 30 MIN: CPT | Performed by: CLINICAL NURSE SPECIALIST

## 2021-07-01 PROCEDURE — 82962 GLUCOSE BLOOD TEST: CPT

## 2021-07-01 PROCEDURE — 99231 SBSQ HOSP IP/OBS SF/LOW 25: CPT | Performed by: NURSE PRACTITIONER

## 2021-07-01 PROCEDURE — 25010000002 LORAZEPAM PER 2 MG: Performed by: CLINICAL NURSE SPECIALIST

## 2021-07-01 PROCEDURE — 83735 ASSAY OF MAGNESIUM: CPT | Performed by: INTERNAL MEDICINE

## 2021-07-01 PROCEDURE — 99232 SBSQ HOSP IP/OBS MODERATE 35: CPT | Performed by: PSYCHIATRY & NEUROLOGY

## 2021-07-01 PROCEDURE — 84100 ASSAY OF PHOSPHORUS: CPT | Performed by: INTERNAL MEDICINE

## 2021-07-01 PROCEDURE — 97116 GAIT TRAINING THERAPY: CPT

## 2021-07-01 PROCEDURE — 25010000003 HYDROMORPHONE 1 MG/ML SOLUTION: Performed by: SURGERY

## 2021-07-01 PROCEDURE — 25010000002 MORPHINE PER 10 MG: Performed by: CLINICAL NURSE SPECIALIST

## 2021-07-01 PROCEDURE — 25010000002 AMPICILLIN PER 500 MG: Performed by: INTERNAL MEDICINE

## 2021-07-01 RX ORDER — MORPHINE SULFATE 20 MG/ML
10 SOLUTION ORAL EVERY 6 HOURS SCHEDULED
Status: DISCONTINUED | OUTPATIENT
Start: 2021-07-01 | End: 2021-07-02 | Stop reason: HOSPADM

## 2021-07-01 RX ORDER — LORAZEPAM 2 MG/ML
2 INJECTION INTRAMUSCULAR
Status: DISCONTINUED | OUTPATIENT
Start: 2021-07-01 | End: 2021-07-02 | Stop reason: HOSPADM

## 2021-07-01 RX ORDER — PROCHLORPERAZINE 25 MG
25 SUPPOSITORY, RECTAL RECTAL EVERY 12 HOURS PRN
Status: DISCONTINUED | OUTPATIENT
Start: 2021-07-01 | End: 2021-07-02 | Stop reason: HOSPADM

## 2021-07-01 RX ORDER — LORAZEPAM 2 MG/ML
1 INJECTION INTRAMUSCULAR
Status: DISCONTINUED | OUTPATIENT
Start: 2021-07-01 | End: 2021-07-02 | Stop reason: HOSPADM

## 2021-07-01 RX ORDER — FUROSEMIDE 10 MG/ML
20 INJECTION INTRAMUSCULAR; INTRAVENOUS EVERY 6 HOURS PRN
Status: DISCONTINUED | OUTPATIENT
Start: 2021-07-01 | End: 2021-07-02 | Stop reason: HOSPADM

## 2021-07-01 RX ORDER — MORPHINE SULFATE 20 MG/ML
10 SOLUTION ORAL
Status: DISCONTINUED | OUTPATIENT
Start: 2021-07-01 | End: 2021-07-02 | Stop reason: HOSPADM

## 2021-07-01 RX ORDER — ACETAMINOPHEN 160 MG/5ML
650 SOLUTION ORAL EVERY 4 HOURS PRN
Status: DISCONTINUED | OUTPATIENT
Start: 2021-07-01 | End: 2021-07-02 | Stop reason: HOSPADM

## 2021-07-01 RX ORDER — ACETAMINOPHEN 650 MG/1
650 SUPPOSITORY RECTAL EVERY 4 HOURS PRN
Status: DISCONTINUED | OUTPATIENT
Start: 2021-07-01 | End: 2021-07-02 | Stop reason: HOSPADM

## 2021-07-01 RX ORDER — GLYCOPYRROLATE 0.2 MG/ML
0.2 INJECTION INTRAMUSCULAR; INTRAVENOUS
Status: DISCONTINUED | OUTPATIENT
Start: 2021-07-01 | End: 2021-07-02 | Stop reason: HOSPADM

## 2021-07-01 RX ORDER — POTASSIUM CHLORIDE 20MEQ/15ML
40 LIQUID (ML) ORAL ONCE
Status: COMPLETED | OUTPATIENT
Start: 2021-07-01 | End: 2021-07-01

## 2021-07-01 RX ORDER — SCOLOPAMINE TRANSDERMAL SYSTEM 1 MG/1
1 PATCH, EXTENDED RELEASE TRANSDERMAL
Status: DISCONTINUED | OUTPATIENT
Start: 2021-07-01 | End: 2021-07-02 | Stop reason: HOSPADM

## 2021-07-01 RX ORDER — LORAZEPAM 2 MG/ML
1 CONCENTRATE ORAL EVERY 6 HOURS
Status: DISCONTINUED | OUTPATIENT
Start: 2021-07-01 | End: 2021-07-02 | Stop reason: HOSPADM

## 2021-07-01 RX ORDER — PROCHLORPERAZINE MALEATE 10 MG
5 TABLET ORAL EVERY 6 HOURS PRN
Status: DISCONTINUED | OUTPATIENT
Start: 2021-07-01 | End: 2021-07-02 | Stop reason: HOSPADM

## 2021-07-01 RX ORDER — LORAZEPAM 2 MG/ML
2 CONCENTRATE ORAL
Status: DISCONTINUED | OUTPATIENT
Start: 2021-07-01 | End: 2021-07-02 | Stop reason: HOSPADM

## 2021-07-01 RX ORDER — LORAZEPAM 2 MG/ML
1 CONCENTRATE ORAL
Status: DISCONTINUED | OUTPATIENT
Start: 2021-07-01 | End: 2021-07-02 | Stop reason: HOSPADM

## 2021-07-01 RX ORDER — ATROPINE SULFATE 10 MG/ML
2 SOLUTION/ DROPS OPHTHALMIC 2 TIMES DAILY PRN
Status: DISCONTINUED | OUTPATIENT
Start: 2021-07-01 | End: 2021-07-02 | Stop reason: HOSPADM

## 2021-07-01 RX ORDER — GLYCOPYRROLATE 0.2 MG/ML
0.4 INJECTION INTRAMUSCULAR; INTRAVENOUS
Status: DISCONTINUED | OUTPATIENT
Start: 2021-07-01 | End: 2021-07-02 | Stop reason: HOSPADM

## 2021-07-01 RX ORDER — ACETAMINOPHEN 325 MG/1
650 TABLET ORAL EVERY 4 HOURS PRN
Status: DISCONTINUED | OUTPATIENT
Start: 2021-07-01 | End: 2021-07-02 | Stop reason: HOSPADM

## 2021-07-01 RX ORDER — PROCHLORPERAZINE EDISYLATE 5 MG/ML
5 INJECTION INTRAMUSCULAR; INTRAVENOUS EVERY 6 HOURS PRN
Status: DISCONTINUED | OUTPATIENT
Start: 2021-07-01 | End: 2021-07-02 | Stop reason: HOSPADM

## 2021-07-01 RX ORDER — QUETIAPINE FUMARATE 25 MG/1
12.5 TABLET, FILM COATED ORAL EVERY 8 HOURS PRN
Status: DISCONTINUED | OUTPATIENT
Start: 2021-07-01 | End: 2021-07-02 | Stop reason: HOSPADM

## 2021-07-01 RX ORDER — DIPHENOXYLATE HYDROCHLORIDE AND ATROPINE SULFATE 2.5; .025 MG/1; MG/1
1 TABLET ORAL
Status: DISCONTINUED | OUTPATIENT
Start: 2021-07-01 | End: 2021-07-02 | Stop reason: HOSPADM

## 2021-07-01 RX ORDER — IPRATROPIUM BROMIDE AND ALBUTEROL SULFATE 2.5; .5 MG/3ML; MG/3ML
3 SOLUTION RESPIRATORY (INHALATION) EVERY 4 HOURS PRN
Status: DISCONTINUED | OUTPATIENT
Start: 2021-07-01 | End: 2021-07-02 | Stop reason: HOSPADM

## 2021-07-01 RX ADMIN — MORPHINE SULFATE 10 MG: 100 SOLUTION ORAL at 23:22

## 2021-07-01 RX ADMIN — LORAZEPAM 1 MG: 2 INJECTION INTRAMUSCULAR; INTRAVENOUS at 17:06

## 2021-07-01 RX ADMIN — MORPHINE SULFATE 4 MG: 4 INJECTION, SOLUTION INTRAMUSCULAR; INTRAVENOUS at 17:36

## 2021-07-01 RX ADMIN — POTASSIUM CHLORIDE 40 MEQ: 20 SOLUTION ORAL at 10:15

## 2021-07-01 RX ADMIN — SODIUM CHLORIDE, PRESERVATIVE FREE 10 ML: 5 INJECTION INTRAVENOUS at 22:17

## 2021-07-01 RX ADMIN — LORAZEPAM 1 MG: 2 INJECTION INTRAMUSCULAR; INTRAVENOUS at 08:48

## 2021-07-01 RX ADMIN — HYDROMORPHONE HYDROCHLORIDE 1 MG: 1 INJECTION, SOLUTION INTRAMUSCULAR; INTRAVENOUS; SUBCUTANEOUS at 10:16

## 2021-07-01 RX ADMIN — IPRATROPIUM BROMIDE AND ALBUTEROL SULFATE 3 ML: 2.5; .5 SOLUTION RESPIRATORY (INHALATION) at 00:58

## 2021-07-01 RX ADMIN — INSULIN LISPRO 2 UNITS: 100 INJECTION, SOLUTION INTRAVENOUS; SUBCUTANEOUS at 10:00

## 2021-07-01 RX ADMIN — IPRATROPIUM BROMIDE AND ALBUTEROL SULFATE 3 ML: 2.5; .5 SOLUTION RESPIRATORY (INHALATION) at 12:44

## 2021-07-01 RX ADMIN — LAMOTRIGINE 100 MG: 100 TABLET ORAL at 08:22

## 2021-07-01 RX ADMIN — QUETIAPINE FUMARATE 200 MG: 200 TABLET, EXTENDED RELEASE ORAL at 22:17

## 2021-07-01 RX ADMIN — LORAZEPAM 1 MG: 2 INJECTION INTRAMUSCULAR; INTRAVENOUS at 03:55

## 2021-07-01 RX ADMIN — HYDROMORPHONE HYDROCHLORIDE 1 MG: 1 INJECTION, SOLUTION INTRAMUSCULAR; INTRAVENOUS; SUBCUTANEOUS at 13:34

## 2021-07-01 RX ADMIN — MORPHINE SULFATE 10 MG: 100 SOLUTION ORAL at 16:16

## 2021-07-01 RX ADMIN — AMPICILLIN SODIUM 2 G: 2 INJECTION, POWDER, FOR SOLUTION INTRAVENOUS at 01:46

## 2021-07-01 RX ADMIN — IPRATROPIUM BROMIDE AND ALBUTEROL SULFATE 3 ML: 2.5; .5 SOLUTION RESPIRATORY (INHALATION) at 06:14

## 2021-07-01 RX ADMIN — AMPICILLIN SODIUM 2 G: 2 INJECTION, POWDER, FOR SOLUTION INTRAVENOUS at 08:57

## 2021-07-01 RX ADMIN — GABAPENTIN 100 MG: 100 CAPSULE ORAL at 06:27

## 2021-07-01 RX ADMIN — ATORVASTATIN CALCIUM 20 MG: 10 TABLET, FILM COATED ORAL at 08:22

## 2021-07-01 RX ADMIN — DOCUSATE SODIUM 50 MG AND SENNOSIDES 8.6 MG 1 TABLET: 8.6; 5 TABLET, FILM COATED ORAL at 08:22

## 2021-07-01 RX ADMIN — SODIUM CHLORIDE, PRESERVATIVE FREE 10 ML: 5 INJECTION INTRAVENOUS at 08:23

## 2021-07-01 RX ADMIN — LORAZEPAM 1 MG: 2 SOLUTION, CONCENTRATE ORAL at 22:17

## 2021-07-01 NOTE — PROGRESS NOTES
Nephrology ([facility]) Progress Note      Patient:  Valerie Kay  YOB: 1975  Date of Service: 7/1/2021  MRN: [id number]   Acct: [id number]   Primary Care Physician: Provider, No Known  Advance Directive:   Code Status and Medical Interventions:   Ordered at: 07/01/21 1332     Level Of Support Discussed With:    Health Care Surrogate     Code Status:    No CPR     Medical Interventions (Level of Support Prior to Arrest):    Comfort Measures     Admit Date: 6/21/2021       Hospital Day: 10  Referring Provider: Young Aquino MD      Patient personally seen and examined.  Complete chart including Consults, Notes, Operative Reports, Labs, Cardiology, and Radiology studies reviewed as able.    Chief complaint: Abnormal labs.    Subjective:    Valerie Kay is a 45 y.o. female  whom we were consulted for acute kidney injury/hypercalcemia.  Patient has no history of chronic kidney disease.  She has history of type 1 diabetes,, chronic obstructive pulmonary disease, hyperlipidemia, hypertension, irritable bowel syndrome, kidney stone, degenerative disc disease, CVA with residual left-sided weakness and bipolar disorder.  Recently presented to [facility] with right upper extremity pain/ischemia related to thromboembolic occlusion of distal right brachial, popliteal and radial artery.  At [facility] Dr. Tian did thromboembolectomy of right brachial and radial artery.  Patient was started on intravenous heparin drip.  She was also diagnosed with aortic endocarditis on SUZY.  She was transferred to [facility] on June 21 for possible aortic valve replacement.  Patient was evaluated by Dr. Velasquez and Dr. Susie Monge and vancomycin was started.  On June 25, she had CT angiogram of coronary arteries.  Her renal function continues to get worse. Patient is awake, but not so responsive.  She she had hypotension after IV Lasix on June 29.  She had to go on some  Levophed and has received a fluid. She continues to be nonoliguric.  Today, family has held a meeting and decision was made to make the patient going under comfort care.  She was sleepy by the time of the exam and psych she has received a dose of Dilaudid.      Allergies:  Metformin, Levaquin [levofloxacin], Prednisone, and Biaxin [clarithromycin]    Home Meds:  Medications Prior to Admission   Medication Sig Dispense Refill Last Dose   • aspirin (aspirin) 81 MG EC tablet Take 81 mg by mouth Daily.   6/21/2021 at Unknown time   • clopidogrel (PLAVIX) 75 MG tablet Take 75 mg by mouth Daily.   6/21/2021 at Unknown time   • gabapentin (NEURONTIN) 100 MG capsule Take 100 mg by mouth 3 (Three) Times a Day.   6/21/2021 at Unknown time   • insulin glargine (Semglee) 100 UNIT/ML injection Inject 20 Units under the skin into the appropriate area as directed every night at bedtime.   6/20/2021 at Unknown time   • insulin lispro (Admelog) 100 UNIT/ML injection Inject 2 Units under the skin into the appropriate area as directed 3 (Three) Times a Day Before Meals.   6/21/2021 at Unknown time   • lamoTRIgine (LaMICtal) 100 MG tablet Take 100 mg by mouth Daily.   6/21/2021 at Unknown time   • lisinopril (PRINIVIL,ZESTRIL) 10 MG tablet Take 10 mg by mouth Daily.   6/21/2021 at Unknown time   • OLANZapine (zyPREXA) 5 MG tablet Take 5 mg by mouth Daily.   6/21/2021 at Unknown time   • ondansetron (ZOFRAN) 8 MG tablet Take 8 mg by mouth 3 (Three) Times a Day Before Meals. to prevent N/V   6/21/2021 at Unknown time   • QUEtiapine XR (SEROquel XR) 200 MG 24 hr tablet Take 200 mg by mouth Every Night.   6/21/2021 at Unknown time   • acetaminophen (TYLENOL) 325 MG tablet Take 650 mg by mouth Every 4 (Four) Hours As Needed for Moderate Pain  or Fever.   Unknown at Unknown time   • acetaminophen (TYLENOL) 650 MG suppository Insert 650 mg into the rectum Every 4 (Four) Hours As Needed for Moderate Pain  or Fever.   Unknown at Unknown time    • traMADol (ULTRAM) 50 MG tablet Take 50 mg by mouth Every 8 (Eight) Hours As Needed for Moderate Pain .   Unknown at Unknown time       Medicines:  Current Facility-Administered Medications   Medication Dose Route Frequency Provider Last Rate Last Admin   • acetaminophen (TYLENOL) tablet 650 mg  650 mg Oral Q4H PRN Noris Wilder APRN        Or   • acetaminophen (TYLENOL) 160 MG/5ML solution 650 mg  650 mg Oral Q4H PRN Noris Wilder APRN        Or   • acetaminophen (TYLENOL) suppository 650 mg  650 mg Rectal Q4H PRN Noris Wilder APRN       • atropine 1 % ophthalmic solution 2 drop  2 drop Sublingual BID PRN Noris Wilder APRN       • diphenoxylate-atropine (LOMOTIL) 2.5-0.025 MG per tablet 1 tablet  1 tablet Oral Q2H PRN Noris Wilder APRN       • furosemide (LASIX) injection 20 mg  20 mg Intravenous Q6H PRN Noris Wilder APRN       • Glycerin-Hypromellose- (ARTIFICIAL TEARS) 0.2-0.2-1 % ophthalmic solution solution 1 drop  1 drop Both Eyes Q30 Min PRN Noris Wilder APRN       • glycopyrrolate (ROBINUL) injection 0.2 mg  0.2 mg Intravenous Q2H PRN Noris Wilder APRN        Or   • glycopyrrolate (ROBINUL) injection 0.2 mg  0.2 mg Subcutaneous Q2H PRN Noris Wilder APRN        Or   • glycopyrrolate (ROBINUL) injection 0.4 mg  0.4 mg Intravenous Q2H PRN Noris Wilder APRN        Or   • glycopyrrolate (ROBINUL) injection 0.4 mg  0.4 mg Subcutaneous Q2H PRN Noris Wilder APRN       • ipratropium-albuterol (DUO-NEB) nebulizer solution 3 mL  3 mL Nebulization Q4H PRN Noris Wilder APRN       • lamoTRIgine (LaMICtal) tablet 100 mg  100 mg Oral Daily Tanya Luo APRN   100 mg at 07/01/21 0822   • LORazepam (ATIVAN) injection 1 mg  1 mg Intravenous Q1H PRN Noris Wilder APRN        Or   • LORazepam (ATIVAN) 2 MG/ML concentrated solution 1 mg  1 mg Sublingual Q1H PRN Noris Wilder APRN       • LORazepam (ATIVAN) 2  MG/ML concentrated solution 1 mg  1 mg Sublingual Q6H Noris Wilder, APRN       • LORazepam (ATIVAN) injection 2 mg  2 mg Intravenous Q1H PRN Noris Wilder APRELEANOR        Or   • LORazepam (ATIVAN) 2 MG/ML concentrated solution 2 mg  2 mg Sublingual Q1H PRN Noris Wilder, APRN       • morphine concentrated solution solution 10 mg  10 mg Sublingual Q6H Noris Wilder, APRN       • morphine injection 4 mg  4 mg Intravenous Q1H PRN Noris Wilder, APRN        Or   • morphine concentrated solution solution 10 mg  10 mg Sublingual Q1H PRN Noris Wilder APRN       • prochlorperazine (COMPAZINE) injection 5 mg  5 mg Intravenous Q6H PRN Noris Wilder APRELEANOR        Or   • prochlorperazine (COMPAZINE) tablet 5 mg  5 mg Oral Q6H PRN Noris Wilder APRELEANOR        Or   • prochlorperazine (COMPAZINE) suppository 25 mg  25 mg Rectal Q12H PRN Noris Wilder APRN       • QUEtiapine (SEROquel) tablet 12.5 mg  12.5 mg Oral Q8H PRN Noris Wilder APRELEANOR       • QUEtiapine XR (SEROquel XR) 24 hr tablet 200 mg  200 mg Oral Nightly Cristino Holguin MD   200 mg at 21   • Scopolamine (TRANSDERM-SCOP) 1.5 MG/3DAYS patch 1 patch  1 patch Transdermal Q72H PRN Noris Wilder APRN       • sennosides-docusate (PERICOLACE) 8.6-50 MG per tablet 1 tablet  1 tablet Oral BID Cristino Holguin MD   1 tablet at 21 0822   • sodium chloride 0.9 % flush 10 mL  10 mL Intravenous Q12H Cristino Holguin MD   10 mL at 21 0823   • sodium chloride 0.9 % flush 10 mL  10 mL Intravenous PRN Cristino Holguin MD           Past Medical History:  Past Medical History:   Diagnosis Date   • Coronary artery disease    • Diabetes mellitus (CMS/HCC)    • Ectopic pregnancy    • Elevated cholesterol    • Ovarian cyst    • PONV (postoperative nausea and vomiting)    • Stroke (CMS/HCC)        Past Surgical History:  Past Surgical History:   Procedure Laterality Date   •   SECTION     • HIP SURGERY Bilateral    • VASCULAR SURGERY      thrombectomy - L popliteal artery and R brachial artery       Family History  Family History   Problem Relation Age of Onset   • Stroke Mother    • Hypertension Mother    • Hypertension Father    • Heart attack Father        Social History  Social History     Socioeconomic History   • Marital status:      Spouse name: Not on file   • Number of children: Not on file   • Years of education: Not on file   • Highest education level: Not on file   Tobacco Use   • Smoking status: Former Smoker     Packs/day: 0.50     Years: 16.00     Pack years: 8.00     Types: Cigarettes     Start date: 2005     Quit date: 2021     Years since quittin.4   • Smokeless tobacco: Never Used   Substance and Sexual Activity   • Alcohol use: Never   • Drug use: Never   • Sexual activity: Defer         Review of Systems:  Unable to obtain    Objective:  Patient Vitals for the past 24 hrs:   BP Temp Temp src Pulse Resp SpO2 Weight   21 1545 -- -- -- 83 8 96 % --   21 1543 -- 97.8 °F (36.6 °C) Axillary 85 -- 95 % --   21 1530 -- -- -- 83 -- 93 % --   21 1515 -- -- -- 84 -- 92 % --   21 1500 (!) 84/35 -- -- 84 -- 96 % --   21 1445 (!) 86/41 -- -- 83 -- 95 % --   21 1430 (!) 80/40 -- -- 85 -- 95 % --   21 1428 -- -- -- 84 -- 95 % --   21 1415 (!) 78/41 -- -- 85 -- 94 % --   21 1400 95/55 -- -- 87 -- 95 % --   21 1345 95/51 -- -- 91 -- 95 % --   21 1330 115/64 -- -- 97 -- 95 % --   21 1315 111/69 -- -- 96 -- 94 % --   21 1300 140/54 -- -- 95 -- 96 % --   07/01/21 1250 -- -- -- 92 22 100 % --   21 1245 100/52 -- -- 93 -- 95 % --   21 1244 -- -- -- 93 17 96 % --   21 1230 105/74 -- -- 91 -- 96 % --   21 1215 116/85 -- -- 88 16 (!) 88 % --   21 1210 -- -- -- 88 -- 95 % --   21 1200 104/62 97.8 °F (36.6 °C) Axillary 88 16 96 % --   21 1145 91/54 --  -- 81 -- 96 % --   07/01/21 1130 (!) 88/49 -- -- 80 -- 96 % --   07/01/21 1115 (!) 87/56 -- -- 80 -- 96 % --   07/01/21 1100 95/49 -- -- 82 -- 97 % --   07/01/21 1045 107/62 -- -- 87 20 98 % --   07/01/21 1030 -- -- -- 92 -- 97 % --   07/01/21 1015 122/56 -- -- 99 -- 97 % --   07/01/21 0945 130/64 -- -- 99 -- 95 % --   07/01/21 0930 (!) 89/71 -- -- 99 -- 92 % --   07/01/21 0915 122/57 -- -- 98 -- 92 % --   07/01/21 0900 120/62 -- -- 97 -- 95 % --   07/01/21 0845 113/52 -- -- 97 -- 91 % --   07/01/21 0830 123/52 96 °F (35.6 °C) Axillary 96 18 93 % --   07/01/21 0815 106/70 -- -- 98 -- 93 % --   07/01/21 0800 127/47 -- -- 97 -- 94 % --   07/01/21 0745 124/63 -- -- 97 -- 92 % --   07/01/21 0730 (!) 89/69 -- -- 94 -- 93 % --   07/01/21 0715 (!) 124/30 -- -- 84 -- 95 % --   07/01/21 0700 113/49 -- -- 86 -- 95 % --   07/01/21 0645 118/61 -- -- 91 -- 94 % --   07/01/21 0630 108/58 -- -- 88 -- 94 % --   07/01/21 0623 -- -- -- 91 22 100 % --   07/01/21 0615 124/58 -- -- 88 -- 93 % --   07/01/21 0614 -- -- -- 87 -- 94 % --   07/01/21 0600 124/63 -- -- 84 13 94 % --   07/01/21 0545 125/56 -- -- 87 -- 95 % --   07/01/21 0530 104/64 -- -- 82 -- 96 % --   07/01/21 0515 91/46 -- -- 79 -- 96 % --   07/01/21 0500 95/51 -- -- 80 13 96 % --   07/01/21 0445 93/46 -- -- 80 -- 97 % --   07/01/21 0430 92/52 -- -- 82 -- 96 % --   07/01/21 0415 96/51 -- -- 83 -- 95 % --   07/01/21 0400 116/51 97 °F (36.1 °C) Axillary 94 13 91 % 76.9 kg (169 lb 8 oz)   07/01/21 0345 -- -- -- 92 -- 92 % --   07/01/21 0330 -- -- -- 92 -- 96 % --   07/01/21 0315 -- -- -- 91 -- 95 % --   07/01/21 0300 108/64 -- -- 91 14 94 % --   07/01/21 0245 119/52 -- -- 92 -- -- --   07/01/21 0230 120/58 -- -- 93 -- 94 % --   07/01/21 0215 98/56 -- -- 86 -- 97 % --   07/01/21 0200 102/47 -- -- 87 11 97 % --   07/01/21 0145 102/45 -- -- 80 -- 96 % --   07/01/21 0130 92/48 -- -- 78 -- 96 % --   07/01/21 0115 98/46 -- -- 81 -- 97 % --   07/01/21 0100 103/58 -- -- 82 15 97 % --    07/01/21 0058 -- -- -- 79 -- 96 % --   07/01/21 0045 96/51 -- -- 79 -- 97 % --   07/01/21 0030 108/55 -- -- 84 -- 96 % --   07/01/21 0015 95/48 -- -- 79 -- 98 % --   07/01/21 0000 96/47 -- -- 80 12 97 % --   06/30/21 2345 109/55 98 °F (36.7 °C) Axillary 85 -- 95 % --   06/30/21 2330 104/51 -- -- 80 -- 96 % --   06/30/21 2315 118/53 -- -- 78 -- 98 % --   06/30/21 2300 106/56 -- -- 79 14 100 % --   06/30/21 2245 -- -- -- 73 -- 99 % --   06/30/21 2230 -- -- -- 75 -- 99 % --   06/30/21 2215 -- -- -- 74 -- 99 % --   06/30/21 2200 -- -- -- 81 14 99 % --   06/30/21 2130 91/49 -- -- 93 -- 97 % --   06/30/21 2115 -- -- -- 99 -- 97 % --   06/30/21 2100 113/52 -- -- 95 20 96 % --   06/30/21 2000 118/56 -- -- 102 15 -- --   06/30/21 1945 -- 98 °F (36.7 °C) Axillary -- -- -- --   06/30/21 1932 -- -- -- 98 22 -- --   06/30/21 1928 -- -- -- 99 22 91 % --   06/30/21 1900 91/73 -- -- 100 23 92 % --   06/30/21 1800 116/71 -- -- 104 20 91 % --   06/30/21 1700 119/96 -- -- 109 16 94 % --   06/30/21 1600 115/54 97.3 °F (36.3 °C) Axillary 104 14 96 % --       Intake/Output Summary (Last 24 hours) at 7/1/2021 1554  Last data filed at 7/1/2021 1544  Gross per 24 hour   Intake 964.5 ml   Output 2750 ml   Net -1785.5 ml     General: Sleepy, lethargic  HEENT: Normocephalic atraumatic head.  Neck: Supple with no JVD or carotid bruits.  Chest:  Bilateral expiratory wheezing.  CVS: regular rate and rhythm  Abdominal: soft, nontender, positive bowel sounds  Extremities: no cyanosis or edema  Skin: warm and dry without rash      Labs:  Results from last 7 days   Lab Units 07/01/21 0224 06/30/21 0318 06/28/21 0223   WBC 10*3/mm3 9.63 11.34* 13.83*   HEMOGLOBIN g/dL 10.3* 9.7* 9.9*   HEMATOCRIT % 32.9* 30.7* 31.6*   PLATELETS 10*3/mm3 570* 568* 530*         Results from last 7 days   Lab Units 07/01/21  0224 06/30/21 0318 06/29/21  0330 06/27/21  0226   SODIUM mmol/L 144 143 142 140   POTASSIUM mmol/L 3.3* 3.1* 3.6 3.3*   CHLORIDE mmol/L 104  104 101 104   CO2 mmol/L 25.0 24.0 23.0 25.0   BUN mg/dL 30* 27* 23* 16   CREATININE mg/dL 4.42* 4.31* 3.93* 2.65*   CALCIUM mg/dL 10.1 10.0 10.5 10.5   BILIRUBIN mg/dL 0.2  --   --  0.2   ALK PHOS U/L 316*  --   --  414*   ALT (SGPT) U/L 10  --   --  14   AST (SGOT) U/L 16  --   --  18   GLUCOSE mg/dL 141* 144* 154* 160*       Radiology:   Imaging Results (Last 72 Hours)     Procedure Component Value Units Date/Time    XR Chest 1 View [[id number]] Collected: 07/01/21 0708     Updated: 07/01/21 0713    Narrative:      EXAMINATION: XR CHEST 1 VW- 7/1/2021 7:08 AM CDT     HISTORY: Right pleural effusion     COMPARISON: 6/30/2021     FINDINGS:  Heart and mediastinal contours appear normal. A small caliber chest tube  is in place with tip projecting over the medial right lung base. There  is a layering left pleural effusion. Diffuse perihilar interstitial  opacities are evident. There is no appreciable pneumothorax.       Impression:      Minimal interval decrease in the degree of pulmonary edema.  Persistent layering left pleural effusion and atelectasis.  This report was finalized on 07/01/2021 07:10 by Dr. Haja Rodrigues MD.    XR Chest 1 View [[id number]] Collected: 06/30/21 0711     Updated: 06/30/21 0716    Narrative:      EXAM: XR CHEST 1 VW- 6/30/2021 4:15 AM CDT     HISTORY: pleural effusion right; R13.10-Dysphagia, unspecified;  Z74.09-Other reduced mobility       COMPARISON: June 29, 2021 at 8:00 AM.     TECHNIQUE: Frontal radiograph of the chest     FINDINGS:   Small-caliber right chest tube is present. Right lung is expanded the  chest wall. There is a moderate to large left pleural effusion. Left  lower lobe is obscured.. Pulmonary vascular margins are slightly  indistinct this may be associated with mild congestive failure Cardiac  silhouette is normal..      The osseous structures and surrounding soft tissues demonstrate no acute  abnormality.          Impression:      1. Large left pleural effusion  2.  Small caliber right chest tube is present in the right lung is  expanded to the chest wall..     3. Indistinct pulmonary vascular margins most likely representing  pulmonary vascular congestion     This report was finalized on 06/30/2021 07:13 by Dr. Wyatt Carroll MD.    XR Chest 1 View [[id number]] Collected: 06/29/21 0825     Updated: 06/29/21 0829    Narrative:      EXAM: XR CHEST 1 VW- 6/29/2021 8:05 AM CDT     HISTORY: pleural effusion right; R13.10-Dysphagia, unspecified;  Z74.09-Other reduced mobility       COMPARISON: June 28, 2021.     TECHNIQUE: Frontal radiograph of the chest     FINDINGS:   A small caliber right chest tube is present. The right lung appears  expanded to the chest wall. Mild opacity in the right infrahilar region  is present. This may be atelectasis.     A moderate left pleural fluid collection is present. The left diaphragms  indistinct and there may be atelectasis in the left lower lobe.. Cardiac  silhouette is normal in size. Slightly shifted from right to left  associated with volume loss in the left lower lobe..      The osseous structures and surrounding soft tissues demonstrate no acute  abnormality.          Impression:      1. Small-caliber right chest tube is present with the right lung is  expanded the chest wall.        2. Opacity right infrahilar region. This may be atelectasis or focal  inflammatory process.  3. Left pleural effusion with atelectasis left lower lobe.        This report was finalized on 06/29/2021 08:26 by Dr. Wyatt Carroll MD.    XR Abdomen KUB [[id number]] Collected: 06/29/21 0720     Updated: 06/29/21 0724    Narrative:      XR ABDOMEN KUB- 6/29/2021 3:05 AM CDT     HISTORY: Check dobhoff placement; R13.10-Dysphagia, unspecified;  Z74.09-Other reduced mobility       COMPARISON: None     FINDINGS:  Moderate left pleural effusion is present.. Dobbhoff tube is coiled in  the esophagus..     No acute skeletal abnormality is identified.        Impression:       1. Dobbhoff tube is coiled in the esophagus and directed to the pharynx.  2. Moderate left pleural effusion.         This report was finalized on 06/29/2021 07:21 by Dr. Wyatt Carroll MD.    XR Chest 1 View [[id number]] Collected: 06/28/21 2108     Updated: 06/28/21 2112    Narrative:      EXAMINATION: XR CHEST 1 VW- 6/28/2021 9:08 PM CDT     HISTORY: post chest tube; R13.10-Dysphagia, unspecified; Z74.09-Other  reduced mobility.     REPORT: A frontal view of the chest was obtained.     COMPARISON: Chest x-rays 6/28/2021 0952 hours.     The lungs are hypoaerated as before, there is consolidation of the left  lung base which is unchanged, with probable atelectasis and effusion,  though pneumonia is not excluded. There is a new smallbore right chest  tube at the right base, this appears to be in satisfactory position. No  pneumothorax is identified. There is mild improvement in aeration of the  right lung base with probable decrease in effusion. Heart size appears  to be normal. There is central vascular congestion and interstitial  edema, with mild improvement. No other change.       Impression:      Interval insertion of a smallbore right basilar chest tube  in good position without pneumothorax. Decrease in right pleural  effusion and improvement in aeration of the right lung is noted. The  left lung is unchanged with left basilar consolidation and there is  persistent central edema.  This report was finalized on 06/28/2021 21:09 by Dr. Hamzah Borja MD.          Culture:  Blood Culture   Date Value Ref Range Status   06/21/2021 No growth at 5 days  Final   06/21/2021 No growth at 5 days  Final     Urine Culture   Date Value Ref Range Status   06/22/2021 No growth  Final         Assessment   1.  Acute kidney injury stage II/worsening.  2.  Acute tubular necrosis/SHANTEL.  3.  Severe hypercalcemia.  4.  Recent thrombectomy of right brachial artery.  5.  Recent CT angiogram of coronary arteries.  6.  Aortic valve  endocarditis.  7.  Enterococcus faecalis bacteremia  8.  History of type 1 diabetes.  9.  Previous history of CVA with left-sided weakness.  10.  Clinically volume overloaded.     Plan:  Patient is now under comfort care.  Family has declined dialysis but worsening kidney function.  Her prognosis is very poor.  Renal service will sign off for now.  Recall as needed.      Mo Starks MD  7/1/2021  15:54 CDT

## 2021-07-01 NOTE — PROGRESS NOTES
Neurology Progress Note      Date of admission: 6/21/2021  6:39 PM  Date of visit: 7/1/2021    Chief Complaint:  F/u stroke 3/2021  and now with aortic valve  endocarditis    Subjective     Subjective:    Patient stood with PT and took steps with constant requirement of verbal and nonverbal cues. However for a couple hours afterwards she c/o pain and so was just given Dilaudid.    Patient not eating much.  Nursing able to get pills in her with applesauce and thickened apple juice.   Palliative care meeting at 1 PM today  Patient began crying when I moved her limbs  Medications:  Current Facility-Administered Medications   Medication Dose Route Frequency Provider Last Rate Last Admin   • acetaminophen (TYLENOL) 160 MG/5ML solution 650 mg  650 mg Oral Q4H PRN Cristino Holguin MD       • acetaminophen (TYLENOL) suppository 650 mg  650 mg Rectal Q4H PRN Cristino Holguin MD       • acetaminophen (TYLENOL) tablet 650 mg  650 mg Oral Q4H PRN Cristino Holguin MD       • ampicillin 2 g/100 mL 0.9% NS (MBP)  2 g Intravenous Q8H Boo Pérez MD   2 g at 07/01/21 0857   • atorvastatin (LIPITOR) tablet 20 mg  20 mg Oral Daily Cristino Holguin MD   20 mg at 07/01/21 0822   • cefTRIAXone (ROCEPHIN) 2 g/100 mL 0.9% NS IVPB (MBP)  2 g Intravenous Q24H Boo Pérez MD   2 g at 06/30/21 1207   • dextrose (D50W) 25 g/ 50mL Intravenous Solution 25 g  25 g Intravenous Q15 Min PRN Cristino Holguin MD       • dextrose (GLUTOSE) oral gel 15 g  15 g Oral Q15 Min PRN Cristino Holguin MD       • enoxaparin (LOVENOX) syringe 30 mg  30 mg Subcutaneous Q24H Sergio Man DO   30 mg at 06/30/21 1705   • gabapentin (NEURONTIN) capsule 100 mg  100 mg Oral Q8H Tanya Luo APRN   100 mg at 07/01/21 0627   • glucagon (human recombinant) (GLUCAGEN DIAGNOSTIC) injection 1 mg  1 mg Subcutaneous Q15 Min PRN Cristino Holguin MD       • HYDROmorphone (DILAUDID) injection 1 mg  1 mg Intravenous  Q2H PRN Cristino Holguin MD   1 mg at 06/30/21 1400   • hydrOXYzine (ATARAX) tablet 25 mg  25 mg Oral Q6H PRN Cristino Holguin MD       • insulin lispro (humaLOG) injection 0-9 Units  0-9 Units Subcutaneous 4x Daily With Meals & Nightly Cristino Holguin MD   4 Units at 06/30/21 1705   • ipratropium-albuterol (DUO-NEB) nebulizer solution 3 mL  3 mL Nebulization Q6H - RT Cristino Holguin MD   3 mL at 07/01/21 0614   • lamoTRIgine (LaMICtal) tablet 100 mg  100 mg Oral Daily Tanya Luo APRN   100 mg at 07/01/21 0822   • LORazepam (ATIVAN) injection 1 mg  1 mg Intravenous Q4H PRN Cristino Holguin MD   1 mg at 07/01/21 0848   • metoprolol tartrate (LOPRESSOR) tablet 25 mg  25 mg Oral Q12H Ida Bonilla APRN   25 mg at 06/30/21 2037   • ondansetron (ZOFRAN) injection 4 mg  4 mg Intravenous Q6H PRN Cristino Holguin MD   4 mg at 06/30/21 0953   • ondansetron (ZOFRAN) tablet 4 mg  4 mg Oral Q6H PRN Cristino Holguin MD       • oxyCODONE-acetaminophen (PERCOCET) 7.5-325 MG per tablet 1 tablet  1 tablet Oral Q4H PRN Cristino Holguin MD       • Pharmacy Consult - Pharmacy to dose   Does not apply Continuous PRN Yulissa Murugia MD       • potassium chloride (KAYCIEL) 20 MEQ/15ML (10%) solution 40 mEq  40 mEq Oral Once Shane Baird DO       • QUEtiapine XR (SEROquel XR) 24 hr tablet 200 mg  200 mg Oral Nightly Cristino Holguin MD   200 mg at 06/30/21 2037   • sennosides-docusate (PERICOLACE) 8.6-50 MG per tablet 1 tablet  1 tablet Oral BID Cristino Holguin MD   1 tablet at 07/01/21 0822   • sodium chloride 0.9 % flush 10 mL  10 mL Intravenous Q12H Cristino Holguin MD   10 mL at 07/01/21 0823   • sodium chloride 0.9 % flush 10 mL  10 mL Intravenous PRN Cristino Holguin MD       • traMADol (ULTRAM) tablet 50 mg  50 mg Oral Q8H PRN Cristino Holguin MD           Review of Systems:   -A 14 point review of systems is unobainable as she is  not speaking    Objective     Objective      Vital Signs  Temp:  [96 °F (35.6 °C)-98 °F (36.7 °C)] 96 °F (35.6 °C)  Heart Rate:  [] 96  Resp:  [11-23] 18  BP: ()/(30-96) 123/52    Physical Exam:    HEENT:  Neck supple Edentulous  CVS:  Regular rate and rhythm.  No murmurs  Carotid Examination:  No bruits  Lungs:  Clear to auscultation  Abdomen:  Non-tender, Non-distended  Extremities:  No signs of peripheral edema    Neurologic Exam:    -Somnolent  -Follows some  simple  commands    Cranial nerves II through XII intact.    Motor: (strength out of 5:  1= minimal movement, 2 = movement in plane of gravity, 3 = movement against gravity, 4 = movement against some resistance, 5 = full strength)    Moves right side more than left especially moves right leg more than left and seems to have normal strength on right    DTR:  2+ throughout in all four extremities    Sensory:  -Intact to light touch, pinprick, temperature, pain, and proprioception    Coordination/Gait:  -No ataxia     Results Review:    I reviewed the patient's new clinical results.    Lab Results (last 24 hours)     Procedure Component Value Units Date/Time    POC Glucose Once [[id number]]  (Abnormal) Collected: 07/01/21 0613    Specimen: Blood Updated: 07/01/21 0625     Glucose 142 mg/dL      Comment: : [id number] Ross Renteria ID: [id number]       Comprehensive Metabolic Panel [[id number]]  (Abnormal) Collected: 07/01/21 0224    Specimen: Blood Updated: 07/01/21 0427     Glucose 141 mg/dL      BUN 30 mg/dL      Creatinine 4.42 mg/dL      Sodium 144 mmol/L      Potassium 3.3 mmol/L      Comment: Slight hemolysis detected by analyzer. Results may be affected.        Chloride 104 mmol/L      CO2 25.0 mmol/L      Calcium 10.1 mg/dL      Total Protein 6.4 g/dL      Albumin 2.20 g/dL      ALT (SGPT) 10 U/L      AST (SGOT) 16 U/L      Comment: Slight hemolysis detected by analyzer. Results may be affected.        Alkaline Phosphatase 316 U/L       Total Bilirubin 0.2 mg/dL      eGFR Non African Amer 11 mL/min/1.73      Comment: <15 Indicative of kidney failure.        eGFR   Amer --     Comment: <15 Indicative of kidney failure.        Globulin 4.2 gm/dL      A/G Ratio 0.5 g/dL      BUN/Creatinine Ratio 6.8     Anion Gap 15.0 mmol/L     Narrative:      GFR Normal >60  Chronic Kidney Disease <60  Kidney Failure <15      Phosphorus [[id number]]  (Normal) Collected: 07/01/21 0224    Specimen: Blood Updated: 07/01/21 0422     Phosphorus 4.0 mg/dL     Magnesium [[id number]]  (Normal) Collected: 07/01/21 0224    Specimen: Blood Updated: 07/01/21 0422     Magnesium 2.0 mg/dL     CBC (No Diff) [[id number]]  (Abnormal) Collected: 07/01/21 0224    Specimen: Blood Updated: 07/01/21 0356     WBC 9.63 10*3/mm3      RBC 3.90 10*6/mm3      Hemoglobin 10.3 g/dL      Hematocrit 32.9 %      MCV 84.4 fL      MCH 26.4 pg      MCHC 31.3 g/dL      RDW 19.3 %      RDW-SD 59.7 fl      MPV 10.6 fL      Platelets 570 10*3/mm3     POC Glucose Once [[id number]]  (Abnormal) Collected: 06/30/21 2140    Specimen: Blood Updated: 06/30/21 2152     Glucose 135 mg/dL      Comment: : [id number] Ross MeeksMeter ID: [id number]       POC Glucose Once [[id number]]  (Abnormal) Collected: 06/30/21 1642    Specimen: Blood Updated: 06/30/21 1653     Glucose 227 mg/dL      Comment: : [id number] Cely Dominguezeter ID: [id number]           Imaging Results (Last 24 Hours)     Procedure Component Value Units Date/Time    XR Chest 1 View [[id number]] Collected: 07/01/21 0708     Updated: 07/01/21 0713    Narrative:      EXAMINATION: XR CHEST 1 VW- 7/1/2021 7:08 AM CDT     HISTORY: Right pleural effusion     COMPARISON: 6/30/2021     FINDINGS:  Heart and mediastinal contours appear normal. A small caliber chest tube  is in place with tip projecting over the medial right lung base. There  is a layering left pleural effusion. Diffuse perihilar interstitial  opacities are evident. There is no  appreciable pneumothorax.       Impression:      Minimal interval decrease in the degree of pulmonary edema.  Persistent layering left pleural effusion and atelectasis.  This report was finalized on 07/01/2021 07:10 by Dr. Haja Rodrigues MD.          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       Chronic Right MCA stroke 4/2021 with left sided weakness and sensory loss  2. Multiple emboli to right arm and left leg s/p thromboembolectomy of right radial, right brachial and left popliteal for acute embolism to those arteries on June 14 2021  She has been on a heparin drip since but stopped on 6/20 due to endocarditis at the OSH  Currently no clinical evidence of another neurological event  3. Aortic valve endocarditis with no evidence of septic emboli on MRI of brain  4. Thrombocytosis  5. Previous blood cultures positive for Enterococcus faecalis  6. Worsening renal function  7. Worsening respiratory failure  8. Possible pseudobulbar affect.  Patient is currently on Seroquel.      Plan:  · Ampicillin and Rocephin  · Seroquel 24-hour 200 mg nightly  · Continue Gabapentin and Lamictal  · Lipitor 20 mg  · Nuedexta stopped  · Lovenox at DVT prophylaxis dosing  · Continue intensive care monitoring  · Will likely require hemodialysis.  · Palliative care  CT surgeon and Hospitalist with family.meeting today        Ana Rosa Luis MD  07/01/21  09:47 CDT

## 2021-07-01 NOTE — PLAN OF CARE
Pt oriented to self. Sometimes oriented to place and time. NSR 70s-80s. Pt on 1L nc. Levo up to .06 max, .02 currently. . 590 drainage from chest tube.  Ativan x1. K+ 3.3 per morning labs, creatine 4.42. No BM. Will continue to monitor.

## 2021-07-01 NOTE — CASE MANAGEMENT/SOCIAL WORK
Continued Stay Note   Nuno     Patient Name: Valerie Kay  MRN: [id number]  Today's Date: 7/1/2021    Admit Date: 6/21/2021    Discharge Plan     Row Name 07/01/21 1357       Plan    Plan  Return to SNF ([facility]) with hospice.    Patient/Family in Agreement with Plan  yes    Plan Comments  [facility] is only hospice provider in [facility].  Referral made to Teresa Barton with [facility].  Will advise when arrangements are complete.        Discharge Codes    No documentation.             LUPIS ChirinosW

## 2021-07-01 NOTE — CASE MANAGEMENT/SOCIAL WORK
Continued Stay Note   Nuno     Patient Name: Valerie Kay  MRN: [id number]  Today's Date: 7/1/2021    Admit Date: 6/21/2021    Discharge Plan     Row Name 07/01/21 1000       Plan    Plan  SNF - [facility]    Plan Comments  Patient has a bed at [facility] when ready for discharge.  Note plans for palliative care/provider meeting today with family.  Will follow and await plan of care.        Discharge Codes    No documentation.             SRINIVASA Chirinos

## 2021-07-01 NOTE — THERAPY TREATMENT NOTE
Acute Care - Physical Therapy Treatment Note  [facility]     Patient Name: Valerie Kay  : 1975  MRN: [id number]  Today's Date: 2021           PT Assessment (last 12 hours)      PT Evaluation and Treatment     Row Name 2132          Physical Therapy Time and Intention    Subjective Information  complains of;pain  -TB     Document Type  therapy note (daily note)  -TB     Mode of Treatment  physical therapy  -TB     Patient Effort  adequate  -TB     Row Name 21          Pain Scale: FACES Pre/Post-Treatment    Pain: FACES Scale, Pretreatment  4-->hurts little more  -TB     Posttreatment Pain Rating  4-->hurts little more  -TB     Pain Location - Side  Right  -TB     Pain Location - Orientation  upper  -TB     Pain Location  chest  -TB     Row Name 21          Bed Mobility    Bed Mobility  scooting/bridging;supine-sit;sit-supine  -TB     Scooting/Bridging [place] (Bed Mobility)  maximum assist (25% patient effort);verbal cues  -TB     Supine-Sit [place] (Bed Mobility)  minimum assist (75% patient effort);verbal cues  -TB     Sit-Supine [place] (Bed Mobility)  moderate assist (50% patient effort);verbal cues  -TB     Assistive Device (Bed Mobility)  head of bed elevated;draw sheet;bed rails  -TB     Row Name 21          Transfers    Transfers  sit-stand transfer;stand-sit transfer  -TB     Comment (Transfers)  x2  -TB     Sit-Stand [place] (Transfers)  moderate assist (50% patient effort);verbal cues  -TB     Stand-Sit [place] (Transfers)  minimum assist (75% patient effort);2 person assist;verbal cues  -TB     Row Name 21          Sit-Stand Transfer    Assistive Device (Sit-Stand Transfers)  other (see comments) HHA  -TB     Row Name 21          Stand-Sit Transfer    Assistive Device (Stand-Sit Transfers)  other (see comments) HHA  -TB     Row Name 21          Gait/Stairs (Locomotion)    [place] Level  (Gait)  minimum assist (75% patient effort);2 person assist;verbal cues  -TB     Assistive Device (Gait)  other (see comments) HHAx2  -TB     Distance in Feet (Gait)  Side steps toward HOB  -TB     Row Name 07/01/21 0932          Balance    Balance Assessment  sitting static balance;sitting dynamic balance;standing static balance  -TB     Static Sitting Balance  WFL;unsupported;sitting, edge of bed  -TB     Dynamic Sitting Balance  WFL;supported;sitting, edge of bed  -TB     Static Standing Balance  mild impairment;supported;standing  -TB     Row Name 07/01/21 0932          Motor Skills    Therapeutic Exercise  ankle;knee;hip  -TB     Row Name 07/01/21 0932          Hip (Therapeutic Exercise)    Hip (Therapeutic Exercise)  AAROM (active assistive range of motion)  -TB     Hip AAROM (Therapeutic Exercise)  bilateral;flexion;extension;aBduction;aDduction;external rotation;internal rotation;supine x15  -TB     Row Name 07/01/21 0932          Knee (Therapeutic Exercise)    Knee (Therapeutic Exercise)  AAROM (active assistive range of motion)  -TB     Knee AAROM (Therapeutic Exercise)  bilateral;flexion;extension;sitting x15  -TB     Row Name 07/01/21 0932          Ankle (Therapeutic Exercise)    Ankle (Therapeutic Exercise)  AAROM (active assistive range of motion)  -TB     Ankle AROM (Therapeutic Exercise)  bilateral;dorsiflexion;plantarflexion;sitting;supine x15  -TB     Row Name             Wound 06/21/21 1933 Left medial malleolus    Wound - Properties Group Placement Date: 06/21/21 -SE Placement Time: 1933  -SE Present on Hospital Admission: Y  -SE Side: Left  -SE Orientation: medial  -SE Location: malleolus  -SE    Retired Wound - Properties Group Date first assessed: 06/21/21 -SE Time first assessed: 1933 -SE Present on Hospital Admission: Y  -SE Side: Left  -SE Location: malleolus  -SE    Row Name             Wound 06/21/21 1900 Right medial antecubital Incision    Wound - Properties Group Placement Date:  06/21/21  -ER, present upon admission  Placement Time: 1900  -ER Side: Right  -ER Orientation: medial  -ER Location: antecubital  -ER Primary Wound Type: Incision  -ER    Retired Wound - Properties Group Date first assessed: 06/21/21  -ER, present upon admission  Time first assessed: 1900  -ER Side: Right  -ER Location: antecubital  -ER Primary Wound Type: Incision  -ER    Row Name             Wound 06/21/21 1900 Left anterior groin Incision    Wound - Properties Group Placement Date: 06/21/21  -ER, present upon admission  Placement Time: 1900  -ER Side: Left  -ER Orientation: anterior  -ER Location: groin  -ER Primary Wound Type: Incision  -ER    Retired Wound - Properties Group Date first assessed: 06/21/21  -ER, present upon admission  Time first assessed: 1900  -ER Side: Left  -ER Location: groin  -ER Primary Wound Type: Incision  -ER    Row Name 07/01/21 0932          Plan of Care Review    Plan of Care Reviewed With  patient  -TB     Outcome Summary  Performed BLE exercises x15 in supine. Bed mon Min A to getto EOB. Performed seated BLE exercises x15. Sit<>stand Min x2 for first traaial and pt would not stand all the way. 2nd attempt was Modx1 and pt came to full standing. Took a few steps forward/toward HOB w/ lots of verbal and non verbal cues due to confusion. Pt needs increasaed time for all activity. Mod x2 back to bed. Recommend SNF at d/c.  -TB     Row Name 07/01/21 0932          Positioning and Restraints    Pre-Treatment Position  in bed  -TB     Post Treatment Position  bed  -TB     In Bed  notified nsg;side lying left;call light within reach;encouraged to call for assist;side rails up x2  -TB     Restraints  released:;reapplied:  -TB       User Key  (r) = Recorded By, (t) = Taken By, (c) = Cosigned By    Initials Name Provider Type    TB Connor Weinberg, PTA Physical Therapy Assistant    Thad Joshi RN Registered Nurse    Erika Moreira RN Registered Nurse        Physical Therapy  Education                 Title: PT OT SLP Therapies (In Progress)     Topic: Physical Therapy (In Progress)     Point: Mobility training (Done)     Learning Progress Summary           Patient Acceptance, E, VU,NR by TEE at 6/22/2021 5738    Comment: benefits of activity, progression of mobility                   Point: Home exercise program (Not Started)     Learner Progress:  Not documented in this visit.          Point: Body mechanics (Not Started)     Learner Progress:  Not documented in this visit.          Point: Precautions (Not Started)     Learner Progress:  Not documented in this visit.                      User Key     Initials Effective Dates Name Provider Type Discipline    TEE 08/02/16 -  Ankit Maldonado PT DPT Physical Therapist PT              PT Recommendation and Plan     Plan of Care Reviewed With: patient  Outcome Summary: Performed BLE exercises x15 in supine. Bed mon Min A to getto EOB. Performed seated BLE exercises x15. Sit<>stand Min x2 for first traaial and pt would not stand all the way. 2nd attempt was Modx1 and pt came to full standing. Took a few steps forward/toward HOB w/ lots of verbal and non verbal cues due to confusion. Pt needs increasaed time for all activity. Mod x2 back to bed. Recommend SNF at d/c.       Time Calculation:   PT Charges     Row Name 07/01/21 1105             Time Calculation    Start Time  0932  -TB      Stop Time  1010  -TB      Time Calculation (min)  38 min  -TB      PT Received On  07/01/21  -TB         Time Calculation- PT    Total Timed Code Minutes- PT  38 minute(s)  -TB        User Key  (r) = Recorded By, (t) = Taken By, (c) = Cosigned By    Initials Name Provider Type    TB Connor Weniberg PTA Physical Therapy Assistant        Therapy Charges for Today     Code Description Service Date Service Provider Modifiers Qty    41948196172 HC PT THER PROC EA 15 MIN 7/1/2021 Connor Weinberg PTA GP 2    [id number] HC GAIT TRAINING EA 15 MIN  7/1/2021 Connor Weinberg, PTA GP 1          PT G-Codes  Outcome Measure Options: AM-PAC 6 Clicks Basic Mobility (PT)  AM-PAC 6 Clicks Score (PT): 12    Connor Weinberg, PTA  7/1/2021

## 2021-07-01 NOTE — PROGRESS NOTES
[facility] Medicine Services  INPATIENT PROGRESS NOTE    Patient Name: Valerie Kay  Date of Admission: 6/21/2021  Today's Date: 07/01/21  Length of Stay: 10  Primary Care Physician: Provider, No Known    Subjective   Chief Complaint: Renal failure.   HPI   Renal function stable today and she remain non-oliguric. Lasix held yesterday.     There is a palliative care meeting forthcoming today at 1 pm, which Dr. Velasquez and I plan to attend. He and I discussed the case again this morning in the ICU.     Review of Systems   All pertinent negatives and positives are as above. All other systems have been reviewed and are negative unless otherwise stated.     Objective    Temp:  [97 °F (36.1 °C)-98 °F (36.7 °C)] 97 °F (36.1 °C)  Heart Rate:  [] 97  Resp:  [11-23] 22  BP: ()/(30-96) 124/63  Physical Exam  Constitutional:       Appearance: She is well-developed.      Comments: Up in bed. No family present. Seen and discussed with her nurse, Natalia.   HENT:      Head: Normocephalic and atraumatic.   Eyes:      Conjunctiva/sclera: Conjunctivae normal.      Pupils: Pupils are equal, round, and reactive to light.   Neck:      Vascular: No JVD.   Cardiovascular:      Rate and Rhythm: Normal rate and regular rhythm.      Heart sounds: Murmur heard.   No friction rub. No gallop.    Pulmonary:      Breath sounds: No wheezing or rales.      Comments: Down to 1L.  Diminished breath sounds at the bases. Right sided thoracostomy tube.   Abdominal:      General: Bowel sounds are normal. There is no distension.      Palpations: Abdomen is soft.      Tenderness: There is no abdominal tenderness. There is no guarding or rebound.   Musculoskeletal:         General: Swelling present. No tenderness or deformity. Normal range of motion.      Cervical back: Neck supple.      Comments: Left upper extremity PICC line.  Right upper extremity has staples in place from recent brachial thrombectomy.  "  Skin:     General: Skin is warm and dry.      Findings: No rash.   Neurological:      Mental Status: She is alert.      Cranial Nerves: No cranial nerve deficit.      Motor: Weakness (L>R) present. No abnormal muscle tone.      Deep Tendon Reflexes: Reflexes normal.   Psychiatric:      Comments: Flat affect. Fatigued. Repetitive phrasing; \"got to get out.\"         Intake/Output Summary (Last 24 hours) at 7/1/2021 0805  Last data filed at 7/1/2021 0400  Gross per 24 hour   Intake 1244.5 ml   Output 2770 ml   Net -1525.5 ml     Results Review:  I have reviewed the labs, radiology results, and diagnostic studies.    Laboratory Data:   Results from last 7 days   Lab Units 07/01/21  0224 06/30/21  0318 06/29/21  0330 06/27/21  0226   SODIUM mmol/L 144 143 142 140   POTASSIUM mmol/L 3.3* 3.1* 3.6 3.3*   CHLORIDE mmol/L 104 104 101 104   CO2 mmol/L 25.0 24.0 23.0 25.0   BUN mg/dL 30* 27* 23* 16   CREATININE mg/dL 4.42* 4.31* 3.93* 2.65*   CALCIUM mg/dL 10.1 10.0 10.5 10.5   BILIRUBIN mg/dL 0.2  --   --  0.2   ALK PHOS U/L 316*  --   --  414*   ALT (SGPT) U/L 10  --   --  14   AST (SGOT) U/L 16  --   --  18   GLUCOSE mg/dL 141* 144* 154* 160*     Culture Data:   Blood Culture   Date Value Ref Range Status   06/21/2021 No growth at 5 days  Final   06/21/2021 No growth at 5 days  Final     Urine Culture   Date Value Ref Range Status   06/22/2021 No growth  Final     Radiology Data:   Imaging Results (Last 24 Hours)     Procedure Component Value Units Date/Time    XR Chest 1 View [[id number]] Collected: 07/01/21 0708     Updated: 07/01/21 0713    Narrative:      EXAMINATION: XR CHEST 1 VW- 7/1/2021 7:08 AM CDT     HISTORY: Right pleural effusion     COMPARISON: 6/30/2021     FINDINGS:  Heart and mediastinal contours appear normal. A small caliber chest tube  is in place with tip projecting over the medial right lung base. There  is a layering left pleural effusion. Diffuse perihilar interstitial  opacities are evident. There " is no appreciable pneumothorax.       Impression:      Minimal interval decrease in the degree of pulmonary edema.  Persistent layering left pleural effusion and atelectasis.  This report was finalized on 07/01/2021 07:10 by Dr. Haja Rodrigues MD.        I have reviewed the patient's current medications.     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 Jovel.  She was excepted as a transfer from [facility] after recently being admitted there.  She was accepted on behalf of Dr. Velasquez with the cardiothoracic surgery service.  During her admission at [place], she was found to have a vegetation of her aortic valve and also had a thrombosis of the right brachial artery that required thrombectomy by Dr. Tian. Dr. Velasquez reviewed her SUZY images and felt that she thickening of her aortic valve.  There was also some concern that her arterial thrombosis was secondary to her endocarditis.  Dr. Man cared for the patient from 6/22-6/27.    Dr. Velasquez continues to follow.  He and I have discussed the case in detail.  He wanted to repeat an echocardiogram on 6/28 to reevaluate.  Dr. Lawrence feels there is a moderate sized, mobile vegetation on the aortic valve.  Moderate aortic valve regurgitation present.  Moderate mitral valve regurgitation present with an eccentric jet noted giving suspicion  to mitral valve endocarditis of the anterior leaflet. She is not a great surgical candidate at present, but he is concerned that this is what she will ultimately require.     Infectious diseases following.  She had positive blood cultures for Enterococcus at the outlying facility.  Blood cultures here have been negative.  She continues on ampicillin and ceftriaxone at this point in time. 6 weeks of IV antibiotic therapy have been recommended.    She has developed problems with acute kidney injury.  She has become volume overloaded.  She has been followed by nephrology recently.  Bilateral renal ultrasound from 6/25 showed no abnormalities.  Renal function declined on a Lasix drip so it was tranisitioned to pushes on 6/29. Stopped on the morning of 6/30 with some low blood pressure. Still have high concerns for eventual dialysis needs.  Renal artery duplex examination on 6/28 was limited that showed patent bilateral renal arteries.      She had worsened from a respiratory standpoint on 6/27.  She was moved to the intensive care unit. She required BiPAP for a time.  Dr. Velasquez drained her right pleural effusion on 6/28.  This is allowed her respiratory status to improve and she remains off BiPAP since this intervention.     Vascular surgery was consulted secondary to her recent right brachial artery thrombectomy.  They are also following for the possibility of the a Groshong catheter to continue antibiotics.  This will be placed on hold at this point.  She may ultimately need a permacath for dialysis.    He has a history of type 1 diabetes that is insulin-dependent.  She is a recent hemoglobin A1c of 6.9.  Recent blood glucoses are fairly well controlled.  Continue Accu-Cheks and sliding scale insulin for now.      Speech has seen several times and has cleared her for pureed foods with nectar thick liquids. Dr. Velasquez may want us to place a Dobbhoff tube for nutrition.    She has been on prophylactic Lovenox.  She has  previously been on a heparin drip.    Her situation continues to be exceedingly complicated. Palliative care consulted on 6/29 with Dr. Velasquez agreeable with this. The patient is unable to make any decisions for herself at present. Her mother is her surrogate decision-maker/POA. Family today at 1 pm.     Electronically signed by Shane Baird DO, 07/01/21, 08:05 CDT.

## 2021-07-01 NOTE — CASE MANAGEMENT/SOCIAL WORK
Continued Stay Note  [facility]     Patient Name: Valerie Kay  MRN: [id number]  Today's Date: 7/1/2021    Admit Date: 6/21/2021    Discharge Plan     Row Name 07/01/21 1500       Plan    Plan  [facility] with [facility]    Patient/Family in Agreement with Plan  yes    Plan Comments  Patient can return to [facility] tomorrow, Friday, July 2nd, with hospice services.  Mitzi, admissions director, aware of patient's tentive discharge.  Patient's mother aware of plan.    Row Name 07/01/21 1357       Plan    Plan  Return to SNF ([facility]) with hospice.    Patient/Family in Agreement with Plan  yes    Plan Comments  [facility] is only hospice provider in [facility].  Referral made to Tersea Barton with [facility].  Will advise when arrangements are complete.        Discharge Codes    No documentation.             LUPIS ChirinosW

## 2021-07-01 NOTE — PROGRESS NOTES
"Palliative Care Daily Progress Note   goals of care/advanced care planning, support for patient/family, hospice referral/discussion and comfort care    Code Status:   Code Status and Medical Interventions:   Ordered at: 06/21/21 2052     Level Of Support Discussed With:    Patient     Code Status:    CPR     Medical Interventions (Level of Support Prior to Arrest):    Full      Advanced Directives: Advance Directive Status: Patient has advance directive, copy in chart   Goals of Care: Ongoing.     S: Medical record reviewed. Events noted.  Continued decline in kidney function in need of dialysis.  Oral intake remains extremely poor.  She remains in soft wrist restraints.  She agitates with minimal stimulus.  Confused.  Due to the Palliative Care Topics Discussed: palliative care, goals of care, care options, resuscitation status, [facility] and discharge options we will establish an advance care plan.   Advance Care Planning   Advance Care Planning Discussion: Continuing care conference with patient's mother/guardian, Carmenza, biological father Gutierrez Matthew, son, Renetta, stepfather, Lit, and sister, Carrol.  Myself,  Palliative -Tamie Alvarezr and Dr. Velasquez and Dr. Baird.  We were able to discuss multiple medical options including continued aggressive care interventions with hemodialysis feeding tube placement, continued long-term antibiotic therapy with a goal of surgical intervention versus transitions with de-escalation of care measures and focus of comfort.  We discussed the goal of comfort is to provide a level of comfort with symptom management given patient's agitation, anxiety, pain, and debilitated state by removing any treatments, diagnostics, and adjuvants including tubes that could be potentially causing her discomfort.  After much discussion family have elected to transition with de-escalation measures to comfort.  They verbalized \"we do not want her to suffer anymore and this is no " "way to live\".  Based upon this decision family would like patient to return to [place] with hospice care if possible as family are not under the capacity to be able to provide the level of caregiver support in the home setting.  We have completed a MOST form document. Questions answered to family's satisfaction.  Spiritual services offered and declined.     Review of Systems   Unable to perform ROS: mental status change     Pain Assessment  Nonverbal Indicators of Pain: nonverbal indicators absent  CPOT and PAINAD Scales: CPOT (Critical-Care Pain Observation Tool)  CPOT Facial Expression: 0-->relaxed, neutral  CPOT Body Movements: 0-->absence of movements  CPOT Muscle Tension: 0-->relaxed  Ventilator Compliance/Vocalization: 0-->talking in normal tone or no sound  CPOT Score: 0  Pain Location: foot  Pain Description: intermittent    O:     Intake/Output Summary (Last 24 hours) at 7/1/2021 1331  Last data filed at 7/1/2021 1206  Gross per 24 hour   Intake 964.5 ml   Output 2455 ml   Net -1490.5 ml       Diagnostics: Reviewed    Current Facility-Administered Medications   Medication Dose Route Frequency Provider Last Rate Last Admin   • acetaminophen (TYLENOL) 160 MG/5ML solution 650 mg  650 mg Oral Q4H PRN Cristino Holguin MD       • acetaminophen (TYLENOL) suppository 650 mg  650 mg Rectal Q4H PRN Cristino Holguin MD       • acetaminophen (TYLENOL) tablet 650 mg  650 mg Oral Q4H PRN Cristino Holguin MD       • ampicillin 2 g/100 mL 0.9% NS (MBP)  2 g Intravenous Q8H Boo Pérez MD   2 g at 07/01/21 0857   • atorvastatin (LIPITOR) tablet 20 mg  20 mg Oral Daily Cristino Holguin MD   20 mg at 07/01/21 0822   • cefTRIAXone (ROCEPHIN) 2 g/100 mL 0.9% NS IVPB (MBP)  2 g Intravenous Q24H Boo Pérez MD   2 g at 06/30/21 1207   • dextrose (D50W) 25 g/ 50mL Intravenous Solution 25 g  25 g Intravenous Q15 Min PRN Cristino Holguin MD       • dextrose (GLUTOSE) oral gel 15 g  15 g Oral " Q15 Min PRN Cristino Holguin MD       • enoxaparin (LOVENOX) syringe 30 mg  30 mg Subcutaneous Q24H Sergio Man DO   30 mg at 06/30/21 1705   • gabapentin (NEURONTIN) capsule 100 mg  100 mg Oral Q8H Tanya Luo APRN   100 mg at 07/01/21 0627   • glucagon (human recombinant) (GLUCAGEN DIAGNOSTIC) injection 1 mg  1 mg Subcutaneous Q15 Min PRN Cristino Holgiun MD       • HYDROmorphone (DILAUDID) injection 1 mg  1 mg Intravenous Q2H PRN Cristino Holguin MD   1 mg at 07/01/21 1016   • hydrOXYzine (ATARAX) tablet 25 mg  25 mg Oral Q6H PRN Cristino Holguin MD       • insulin lispro (humaLOG) injection 0-9 Units  0-9 Units Subcutaneous 4x Daily With Meals & Nightly Cristino Holguin MD   2 Units at 07/01/21 1000   • ipratropium-albuterol (DUO-NEB) nebulizer solution 3 mL  3 mL Nebulization Q6H - RT Cristino Holguin MD   3 mL at 07/01/21 1244   • lamoTRIgine (LaMICtal) tablet 100 mg  100 mg Oral Daily Tanya Luo APRN   100 mg at 07/01/21 0822   • LORazepam (ATIVAN) injection 1 mg  1 mg Intravenous Q4H PRN Cristino Holguin MD   1 mg at 07/01/21 0848   • metoprolol tartrate (LOPRESSOR) tablet 25 mg  25 mg Oral Q12H Ida Bonilla APRN   25 mg at 06/30/21 2037   • ondansetron (ZOFRAN) injection 4 mg  4 mg Intravenous Q6H PRN Cristino Holguin MD   4 mg at 06/30/21 0953   • ondansetron (ZOFRAN) tablet 4 mg  4 mg Oral Q6H PRN Cristino Holguin MD       • oxyCODONE-acetaminophen (PERCOCET) 7.5-325 MG per tablet 1 tablet  1 tablet Oral Q4H PRN Cristino Holguin MD       • Pharmacy Consult - Pharmacy to dose   Does not apply Continuous PRN Mark-Monge, Yulissa J, MD       • QUEtiapine XR (SEROquel XR) 24 hr tablet 200 mg  200 mg Oral Nightly Cristino Holguin MD   200 mg at 06/30/21 2037   • sennosides-docusate (PERICOLACE) 8.6-50 MG per tablet 1 tablet  1 tablet Oral BID Cristino Holguin MD   1 tablet at 07/01/21 0822   • sodium chloride  "0.9 % flush 10 mL  10 mL Intravenous Q12H Cristino Holguin MD   10 mL at 07/01/21 0823   • sodium chloride 0.9 % flush 10 mL  10 mL Intravenous PRN Cristino Holguin MD       • traMADol (ULTRAM) tablet 50 mg  50 mg Oral Q8H PRN Cristino Holguin MD         Pharmacy Consult - Pharmacy to dose,       •  acetaminophen  •  acetaminophen  •  acetaminophen  •  dextrose  •  dextrose  •  glucagon (human recombinant)  •  HYDROmorphone  •  hydrOXYzine  •  LORazepam  •  ondansetron  •  ondansetron  •  oxyCODONE-acetaminophen  •  Pharmacy Consult - Pharmacy to dose  •  sodium chloride  •  traMADol    A:    /69   Pulse 97   Temp 97.8 °F (36.6 °C) (Axillary)   Resp 22   Ht 165.1 cm (65\")   Wt 76.9 kg (169 lb 8 oz)   LMP  (LMP Unknown)   SpO2 95%   Breastfeeding No   BMI 28.21 kg/m²     Vitals and nursing note reviewed.   Constitutional:       Appearance: Ill-appearing and chronically ill-appearing.      Interventions: Restrained. Nasal cannula in place.   Eyes:      General: Lids are normal.   HENT:      Head: Normocephalic.   Pulmonary:      Effort: Pulmonary effort is normal.   Cardiovascular:      Normal rate. Regular rhythm.   Edema:     Peripheral edema present.  Abdominal:      General: Bowel sounds are normal.   Musculoskeletal:      Cervical back: Neck supple.   Skin:     General: Skin is warm and dry.   Genitourinary:     Comments: Valdovinos catheter in place  Neurological:      Mental Status: Alert. Confused.      Comments: Impulsive and lacks insight.    Psychiatric:         Mood and Affect: Mood is anxious.         Behavior: Behavior is agitated.         Cognition and Memory: Cognition is impaired.      Patient status: Disease state: Controlled with current treatments.  Functional status: Palliative Performance Scale Score: Performance 30% based on the following measures: Ambulation: Totally bed bound, Activity and Evidence of Disease: Unable to do any work, extensive evidence of disease, " Self-Care: Total care required,  Intake: Reduced, LOC: Full, drowsy or confusion   Nutritional status: Albumin 2.20. Body mass index is 28.29 kg/m².      Family support: The patient receives support from her mother..  Advance Directives: Advance Directive Status: Patient does not have advance directive   POA/Healthcare surrogate-Carmenza Alfaro, mother/POA.     Impression/Problem List:     1.  Acute bacterial endocarditis  2.  Aortic valve vegetation  3.  Mitral valve regurgitation with concern for mitral valve endocarditis  4.  Enterococcal bacteremia  5.  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  Acute respiratory failure with hypoxia  7.  Bilateral pleural effusion, s/p chest tube right side  8.  Acute kidney injury  9.  Fluid overload  10.  History of right MCA CVA with residual left-sided weakness and concerns for pseudobulbar affect  11.  Anemia of chronic disease  12.  Diabetes mellitus type 1  13.  COPD  14.  Hyperlipidemia  15.  Hypertension  16.  Bipolar disorder  17.  Anxiety/depression     Recommendations/Plan:  1. plan: Goals of care include CODE STATUS No CPR/comfort interventions.     2.  Palliative care encounter  -CODE STATUS changes to comfort  -Family would like patient to return to [place] with hospice care.  Discussed with PHOENIX Umanzor    7/1-Continuing care conference with patient's mother/guardian, Carmenza, biological father Gutierrez Matthew, son, Renetta, stepfather, iLt, and sister, Carrol.  Myself,  Palliative -Tamie Walton and Dr. Velasquez and Dr. Baird.  We were able to discuss multiple medical options including continued aggressive care interventions with hemodialysis feeding tube placement, continued long-term antibiotic therapy with a goal of surgical intervention versus transitions with de-escalation of care measures and focus of comfort.  We discussed the goal of comfort is to provide a level of comfort with symptom management given patient's  "agitation, anxiety, pain, and debilitated state by removing any treatments, diagnostics, and adjuvants including tubes that could be potentially causing her discomfort.  After much discussion family have elected to transition with de-escalation measures to comfort.  They verbalized \"we do not want her to suffer anymore and this is no way to live\".  Based upon this decision family would like patient to return to [place] with hospice care if possible as family are not under the capacity to be able to provide the level of caregiver support in the home setting.  We have completed a MOST form document. Questions answered to family's satisfaction.  Spiritual services offered and declined.     3.  Comfort care  -Discontinue any treatments and adjuvants comfort  -Continue palliative wound care  -May utilize Valdovinos catheter for comfort  -We will continue bipolar disorder and seizure disorder medications while patient is able to safely swallow.  -Add scheduled morphine and Ativan concentrated solution every 6 hours and as needed for effective symptom management  -Continue scheduled Seroquel and add Seroquel 12.5 mg every 8 hours as needed for agitation. QTc-475  -Discontinue wrist restraints  -Dr. Velasquez to remove chest tube.  -Lasix as needed pulmonary congestion.  -Continue diet for comfort in patient high risk for aspiration      Thank you for allowing us to participate in patient's plan of care. Palliative Care Team will continue to follow patient.     Time spent: 80 minutes spent reviewing medical and medication records, assessing and examining patient, discussing with family, answering questions, providing some guidance about a plan and documentation of care, and coordinating care with other healthcare members, with > 50% time spent face to face.   48 minutes spent on advance care planning.    Noris Wilder, APRN  7/1/2021  "

## 2021-07-01 NOTE — PLAN OF CARE
Goal Outcome Evaluation:  Plan of Care Reviewed With: other (see comments)        Progress: no change  Outcome Summary: Ntn follow up. Pureed diet,nectar thickened liquids. Oral intake 25% of five meals; total feed w/ meals. Boost Glucose Control (nectar thickened) BID added. Pt may benefit from AMONS if agressive measures continued. Palliative care mtg. scheduled for today noted. Cont to follow for POC.

## 2021-07-01 NOTE — PLAN OF CARE
Goal Outcome Evaluation:  Plan of Care Reviewed With: patient           Outcome Summary: Performed BLE exercises x15 in supine. Bed mon Min A to getto EOB. Performed seated BLE exercises x15. Sit<>stand Min x2 for first traaial and pt would not stand all the way. 2nd attempt was Modx1 and pt came to full standing. Took a few steps forward/toward HOB w/ lots of verbal and non verbal cues due to confusion. Pt needs increasaed time for all activity. Mod x2 back to bed. Recommend SNF at d/c.

## 2021-07-01 NOTE — PROGRESS NOTES
"Patient name: Valerie Kay  Patient : 1975  VISIT # [id number]  MR #[id number]    Procedure:  Procedure Date:  POD:* No surgery found *    Subjective   CC: shortness of breath, endocarditis     Patient remains on 1 liter nasal cannula.  Right chest tube is to 20 cm suction with no air leak.  Patient remains oriented to self and place.  Creatinine continues to elevate, 4.42 this morning.  She did have episodes of hypotension again during the night requiring Levophed and remains on at .02    ROS: no fevers or chills     Objective     Visit Vitals  /52   Pulse 96   Temp 96 °F (35.6 °C) (Axillary)   Resp 18   Ht 165.1 cm (65\")   Wt 76.9 kg (169 lb 8 oz)   LMP  (LMP Unknown)   SpO2 93%   Breastfeeding No   BMI 28.21 kg/m²       Intake/Output Summary (Last 24 hours) at 2021 0840  Last data filed at 2021 0835  Gross per 24 hour   Intake 1244.5 ml   Output 3070 ml   Net -1825.5 ml     RCT: 1170 ml/24 hours, no air leak    Lab:        IMAGES:       Imaging Results (Last 24 Hours)     Procedure Component Value Units Date/Time    XR Chest 1 View [[id number]] Collected: 21     Updated: 21    Narrative:      EXAMINATION: XR CHEST 1 VW- 2021 7:08 AM CDT     HISTORY: Right pleural effusion     COMPARISON: 2021     FINDINGS:  Heart and mediastinal contours appear normal. A small caliber chest tube  is in place with tip projecting over the medial right lung base. There  is a layering left pleural effusion. Diffuse perihilar interstitial  opacities are evident. There is no appreciable pneumothorax.       Impression:      Minimal interval decrease in the degree of pulmonary edema.  Persistent layering left pleural effusion and atelectasis.  This report was finalized on 2021 07:10 by Dr. Haja Rodrigues MD.        CXR: Right chest tube in stable position, no pneumothorax.  Mild pulmonary vascular congestion improving.  Left pleural effusion    Physical Exam:  General: Alert. " Resting in bed. Tolerating nasal cannula.   Cardiovascular: Regular rate and rhythm with murmur.     Pulmonary: Diminished breath sounds bilaterally.    Abdomen: Soft, mildly distended, and nontender.   Extremities: Warm, moves all extremities. Mild lower extremity edema  Neurologic:  Grossly intact with no focal deficits.   Weakness. Agitated and confused       Impression:  Aortic valve endocarditis  History of bacteremia, enterococcus faecalis   Recent CVA  Type 1 diabetes mellitus  Hyperlipidemia  COPD  Seizures  Bipolar disorder  PVD with recent embolic occlusion of the left upper extremity and popliteal embolus resulting in embolectomy       Plan:  She remains a high risk surgical candidate and renal function continues to decline.   Palliative care has evaluated.  Dr. Velasquez did have a lengthy discussion with the patient's mother on the phone regarding the risks associated with proceeding with surgery at this time.  A family conference has been planned for today   We will continue to follow    CHRISTINE Faust  07/01/21  08:40 CDT

## 2021-07-01 NOTE — PROGRESS NOTES
[facility] Medicine Services  Advance Care Planning      Valerie Kay  07/01/21  13:29 CDT      An advanced care planning conversation was facilitated by palliative care.     The patient's father, stepfather, mother, sister, and her son were present and agreeable to the discussion with myself, Tamie Walton, CHRISTINE Victor, and Dr. Drew Velasquez with cardiothoracic surgery.     Content discussed was her recurrent state, future state, expected outcomes, the possibility of dialysis, the possibility of prolonged antibiotics versus eventual surgical intervention.  There was much discussion between the care team and the patient's family.  They feel that the patient's baseline at present is poor to begin with and that she would not have an appropriate outcome no matter what direction we took her Natalya.  Dr. Velasquez talked about more immediate surgery versus prolonged antibiotic therapy and waiting on eventual surgery.  We talked about her upcoming need for dialysis in all likelihood.  We talked about complications that could arise secondary to her poor nutritional status and difficulty feeding.  They would not want feeding tubes.  They would like her chest tube removed.  They do not want to do anything aggressive any longer in her care.  They wish for her to return to [facility] for hospice care.  The patient's mother states that she had much difficulty caring for her after her stroke in the home setting and could not possibly imagine being able to take care of her in her current state.  We will make efforts to make her comfortable and have her return to the facility soon.    CODE STATUS will be no CPR and comfort measures only.         Total time spent in coordination of advance care planning was > 30 minutes.       Electronically signed by Shane Baird DO, 07/01/21, 13:33 CDT.

## 2021-07-01 NOTE — PROGRESS NOTES
"INFECTIOUS DISEASES PROGRESS NOTE    Patient:  Valerie Kay  YOB: 1975  MRN: [id number]   Admit date: 6/21/2021   Admitting Physician: Shane Baird DO  Primary Care Physician: Provider, No Known    Chief Complaint: Patient offers no complaints        Interval History: No new problems or issues reported per nursing.    Allergies:   Allergies   Allergen Reactions   • Metformin Other (See Comments)     States \"it hurt my kidneys\"   • Levaquin [Levofloxacin] Other (See Comments)     Rash   • Prednisone Other (See Comments)     Rash   • Biaxin [Clarithromycin] Rash       Current Meds:     Current Facility-Administered Medications:   •  acetaminophen (TYLENOL) 160 MG/5ML solution 650 mg, 650 mg, Oral, Q4H PRN, Cristino Holguin MD  •  acetaminophen (TYLENOL) suppository 650 mg, 650 mg, Rectal, Q4H PRN, Cristino Holguin MD  •  acetaminophen (TYLENOL) tablet 650 mg, 650 mg, Oral, Q4H PRN, Cristino Holguin MD  •  ampicillin 2 g/100 mL 0.9% NS (MBP), 2 g, Intravenous, Q8H, Boo Pérez MD, 2 g at 07/01/21 0146  •  atorvastatin (LIPITOR) tablet 20 mg, 20 mg, Oral, Daily, Cristino Holguin MD, 20 mg at 07/01/21 0822  •  cefTRIAXone (ROCEPHIN) 2 g/100 mL 0.9% NS IVPB (MBP), 2 g, Intravenous, Q24H, Boo Pérez MD, 2 g at 06/30/21 1207  •  dextrose (D50W) 25 g/ 50mL Intravenous Solution 25 g, 25 g, Intravenous, Q15 Min PRN, Cristino Holguin MD  •  dextrose (GLUTOSE) oral gel 15 g, 15 g, Oral, Q15 Min PRN, Cristino Holguin MD  •  enoxaparin (LOVENOX) syringe 30 mg, 30 mg, Subcutaneous, Q24H, Sergio Man DO, 30 mg at 06/30/21 1705  •  gabapentin (NEURONTIN) capsule 100 mg, 100 mg, Oral, Q8H, Tanya Luo APRN, 100 mg at 07/01/21 0627  •  glucagon (human recombinant) (GLUCAGEN DIAGNOSTIC) injection 1 mg, 1 mg, Subcutaneous, Q15 Min PRN, Cristino Holguin MD  •  HYDROmorphone (DILAUDID) injection 1 mg, 1 mg, Intravenous, Q2H PRN, Cristino Holguin, " MD, 1 mg at 06/30/21 1400  •  hydrOXYzine (ATARAX) tablet 25 mg, 25 mg, Oral, Q6H PRN, Cristino Holguin MD  •  insulin lispro (humaLOG) injection 0-9 Units, 0-9 Units, Subcutaneous, 4x Daily With Meals & Nightly, Cristino Holguin MD, 4 Units at 06/30/21 1705  •  ipratropium-albuterol (DUO-NEB) nebulizer solution 3 mL, 3 mL, Nebulization, Q6H - RT, Cristino Holguin MD, 3 mL at 07/01/21 0614  •  lamoTRIgine (LaMICtal) tablet 100 mg, 100 mg, Oral, Daily, Tanya Luo APRN, 100 mg at 07/01/21 0822  •  LORazepam (ATIVAN) injection 1 mg, 1 mg, Intravenous, Q4H PRN, Cristino Holguin MD, 1 mg at 07/01/21 0355  •  metoprolol tartrate (LOPRESSOR) tablet 25 mg, 25 mg, Oral, Q12H, Ida Bonilla APRN, 25 mg at 06/30/21 2037  •  ondansetron (ZOFRAN) injection 4 mg, 4 mg, Intravenous, Q6H PRN, Cristino Holguin MD, 4 mg at 06/30/21 0953  •  ondansetron (ZOFRAN) tablet 4 mg, 4 mg, Oral, Q6H PRN, Cristino Holguin MD  •  oxyCODONE-acetaminophen (PERCOCET) 7.5-325 MG per tablet 1 tablet, 1 tablet, Oral, Q4H PRN, Cristino Holguin MD  •  Pharmacy Consult - Pharmacy to dose, , Does not apply, Continuous PRN, Yulissa Murguia MD  •  potassium chloride (KAYCIEL) 20 MEQ/15ML (10%) solution 40 mEq, 40 mEq, Oral, Once, Shane Baird,   •  QUEtiapine XR (SEROquel XR) 24 hr tablet 200 mg, 200 mg, Oral, Nightly, Cristino Holguin MD, 200 mg at 06/30/21 2037  •  sennosides-docusate (PERICOLACE) 8.6-50 MG per tablet 1 tablet, 1 tablet, Oral, BID, Cristino Holguin MD, 1 tablet at 07/01/21 0822  •  sodium chloride 0.9 % flush 10 mL, 10 mL, Intravenous, Q12H, Cristino Holguin MD, 10 mL at 07/01/21 0823  •  sodium chloride 0.9 % flush 10 mL, 10 mL, Intravenous, PRN, Cristino Holguin MD  •  traMADol (ULTRAM) tablet 50 mg, 50 mg, Oral, Q8H PRN, Cristino Holguin MD      Review of Systems   Unable to perform ROS: Mental status change       Objective     Vital  "Signs:  Temp (24hrs), Av.6 °F (36.4 °C), Min:97 °F (36.1 °C), Max:98 °F (36.7 °C)      /63   Pulse 97   Temp 97 °F (36.1 °C) (Axillary)   Resp 22   Ht 165.1 cm (65\")   Wt 76.9 kg (169 lb 8 oz)   LMP  (LMP Unknown)   SpO2 92%   Breastfeeding No   BMI 28.21 kg/m²         Physical Exam  General: PatientWas being pulled up in bed by nursing at bedside.  HEENT: Sclera anicteric  Respiratory: Effort even and unlabored  Neuro: Patient kept repeating \"I want to go\" when the nurse said \"you want to go\" patient did answer \"yes\"  Psych: Somewhat tearful        Results Review:    I reviewed the patient's new clinical results.    Lab Results:  CBC:   Lab Results   Lab 21  0458 21  0346 21  0226 21  0223 21  022   WBC 9.71 10.03 10.40 13.83* 11.34* 9.63   HEMOGLOBIN 9.8* 9.4* 8.8* 9.9* 9.7* 10.3*   HEMATOCRIT 30.8* 30.2* 27.8* 31.6* 30.7* 32.9*   PLATELETS 557* 558* 512* 530* 568* 570*         CMP:   Lab Results   Lab 21  0226 21  0330 21   SODIUM 140 142 143 144   POTASSIUM 3.3* 3.6 3.1* 3.3*   CHLORIDE 104 101 104 104   CO2 25.0 23.0 24.0 25.0   BUN 16 23* 27* 30*   CREATININE 2.65* 3.93* 4.31* 4.42*   CALCIUM 10.5 10.5 10.0 10.1   BILIRUBIN 0.2  --   --  0.2   ALK PHOS 414*  --   --  316*   ALT (SGPT) 14  --   --  10   AST (SGOT) 18  --   --  16   GLUCOSE 160* 154* 144* 141*         Culture Results:    No results found for: BLOODCX, URINECX, WOUNDCX, MRSACX, RESPCX, STOOLCX    Microbiology Results Abnormal     Procedure Component Value - Date/Time    Eosinophil Smear - Urine, Urinary Bladder [[id number]]  (Normal) Collected: 21 1404    Lab Status: Final result Specimen: Urine from Urinary Bladder Updated: 21 0437     Eosinophil Smear 0 % EOS/100 Cells     COVID PRE-OP / PRE-PROCEDURE SCREENING ORDER (NO ISOLATION) - Swab, Nasal Cavity [[id number]]  (Normal) Collected: 21    Lab Status: Final result " Specimen: Swab from Nasal Cavity Updated: 06/27/21 2141    Narrative:      The following orders were created for panel order COVID PRE-OP / PRE-PROCEDURE SCREENING ORDER (NO ISOLATION) - Swab, Nasal Cavity.  Procedure                               Abnormality         Status                     ---------                               -----------         ------                     COVID-19,[facility] [employer] [facility]...[[id number]]  Normal              Final result                 Please view results for these tests on the individual orders.    COVID-19,[facility] [employer] IN-HOUSE,Nasal Swab No Transport Media 3-4 HR TAT - Swab, Nasal Cavity [[id number]]  (Normal) Collected: 06/27/21 2021    Lab Status: Final result Specimen: Swab from Nasal Cavity Updated: 06/27/21 2141     COVID19 Not Detected    Narrative:      Fact sheet for providers: https://www.fda.gov/media/107106/download     Fact sheet for patients: https://www.fda.gov/media/790607/download    Test performed by PCR.    Consider negative results in combination with clinical observations, patient history, and epidemiological information.    Blood Culture - Blood, Hand, Left [[id number]] Collected: 06/21/21 2210    Lab Status: Final result Specimen: Blood from Hand, Left Updated: 06/26/21 2230     Blood Culture No growth at 5 days    Blood Culture - Blood, Arm, Left [[id number]] Collected: 06/21/21 2200    Lab Status: Final result Specimen: Blood from Arm, Left Updated: 06/26/21 2230     Blood Culture No growth at 5 days    Urine Culture - Urine, Urine, Catheter [[id number]]  (Normal) Collected: 06/22/21 0326    Lab Status: Final result Specimen: Urine, Catheter Updated: 06/23/21 1035     Urine Culture No growth          Interpretation Summary    · Left ventricular ejection fraction appears to be 61 - 65%.  · There is a moderate sized, mobile vegetation on the aortic valve.  · Moderate aortic valve regurgitation is present.  · Moderate mitral valve regurgitation is present with an  eccentric jet noted.  · Suspected mitral valve endocarditis of the anterior leaflet  · Estimated right ventricular systolic pressure from tricuspid regurgitation is moderately elevated (45-55 mmHg).  · Moderate pulmonary hypertension is present.     Signed    Electronically signed by Gino Lawrence MD on 6/28/21 at 1532 CDT           Radiology:   Imaging Results (Last 72 Hours)     Procedure Component Value Units Date/Time    XR Chest 1 View [[id number]] Collected: 07/01/21 0708     Updated: 07/01/21 0713    Narrative:      EXAMINATION: XR CHEST 1 VW- 7/1/2021 7:08 AM CDT     HISTORY: Right pleural effusion     COMPARISON: 6/30/2021     FINDINGS:  Heart and mediastinal contours appear normal. A small caliber chest tube  is in place with tip projecting over the medial right lung base. There  is a layering left pleural effusion. Diffuse perihilar interstitial  opacities are evident. There is no appreciable pneumothorax.       Impression:      Minimal interval decrease in the degree of pulmonary edema.  Persistent layering left pleural effusion and atelectasis.  This report was finalized on 07/01/2021 07:10 by Dr. Haja Rodrigues MD.    XR Chest 1 View [[id number]] Collected: 06/30/21 0711     Updated: 06/30/21 0716    Narrative:      EXAM: XR CHEST 1 VW- 6/30/2021 4:15 AM CDT     HISTORY: pleural effusion right; R13.10-Dysphagia, unspecified;  Z74.09-Other reduced mobility       COMPARISON: June 29, 2021 at 8:00 AM.     TECHNIQUE: Frontal radiograph of the chest     FINDINGS:   Small-caliber right chest tube is present. Right lung is expanded the  chest wall. There is a moderate to large left pleural effusion. Left  lower lobe is obscured.. Pulmonary vascular margins are slightly  indistinct this may be associated with mild congestive failure Cardiac  silhouette is normal..      The osseous structures and surrounding soft tissues demonstrate no acute  abnormality.          Impression:      1. Large left pleural  effusion  2. Small caliber right chest tube is present in the right lung is  expanded to the chest wall..     3. Indistinct pulmonary vascular margins most likely representing  pulmonary vascular congestion     This report was finalized on 06/30/2021 07:13 by Dr. Wyatt Carroll MD.    XR Chest 1 View [[id number]] Collected: 06/29/21 0825     Updated: 06/29/21 0829    Narrative:      EXAM: XR CHEST 1 VW- 6/29/2021 8:05 AM CDT     HISTORY: pleural effusion right; R13.10-Dysphagia, unspecified;  Z74.09-Other reduced mobility       COMPARISON: June 28, 2021.     TECHNIQUE: Frontal radiograph of the chest     FINDINGS:   A small caliber right chest tube is present. The right lung appears  expanded to the chest wall. Mild opacity in the right infrahilar region  is present. This may be atelectasis.     A moderate left pleural fluid collection is present. The left diaphragms  indistinct and there may be atelectasis in the left lower lobe.. Cardiac  silhouette is normal in size. Slightly shifted from right to left  associated with volume loss in the left lower lobe..      The osseous structures and surrounding soft tissues demonstrate no acute  abnormality.          Impression:      1. Small-caliber right chest tube is present with the right lung is  expanded the chest wall.        2. Opacity right infrahilar region. This may be atelectasis or focal  inflammatory process.  3. Left pleural effusion with atelectasis left lower lobe.        This report was finalized on 06/29/2021 08:26 by Dr. Wyatt Carroll MD.    XR Abdomen KUB [[id number]] Collected: 06/29/21 0720     Updated: 06/29/21 0724    Narrative:      XR ABDOMEN KUB- 6/29/2021 3:05 AM CDT     HISTORY: Check dobhoff placement; R13.10-Dysphagia, unspecified;  Z74.09-Other reduced mobility       COMPARISON: None     FINDINGS:  Moderate left pleural effusion is present.. Dobbhoff tube is coiled in  the esophagus..     No acute skeletal abnormality is identified.         Impression:      1. Dobbhoff tube is coiled in the esophagus and directed to the pharynx.  2. Moderate left pleural effusion.         This report was finalized on 06/29/2021 07:21 by Dr. Wyatt Carroll MD.    XR Chest 1 View [[id number]] Collected: 06/28/21 2108     Updated: 06/28/21 2112    Narrative:      EXAMINATION: XR CHEST 1 VW- 6/28/2021 9:08 PM CDT     HISTORY: post chest tube; R13.10-Dysphagia, unspecified; Z74.09-Other  reduced mobility.     REPORT: A frontal view of the chest was obtained.     COMPARISON: Chest x-rays 6/28/2021 0952 hours.     The lungs are hypoaerated as before, there is consolidation of the left  lung base which is unchanged, with probable atelectasis and effusion,  though pneumonia is not excluded. There is a new smallbore right chest  tube at the right base, this appears to be in satisfactory position. No  pneumothorax is identified. There is mild improvement in aeration of the  right lung base with probable decrease in effusion. Heart size appears  to be normal. There is central vascular congestion and interstitial  edema, with mild improvement. No other change.       Impression:      Interval insertion of a smallbore right basilar chest tube  in good position without pneumothorax. Decrease in right pleural  effusion and improvement in aeration of the right lung is noted. The  left lung is unchanged with left basilar consolidation and there is  persistent central edema.  This report was finalized on 06/28/2021 21:09 by Dr. Hamzah Borja MD.    US Renal Artery Complete [[id number]] Collected: 06/28/21 1359     Updated: 06/28/21 1409    Narrative:      EXAM: US RENAL ARTERY COMPLETE- - 6/28/2021 11:02 AM CDT     HISTORY: Possible embolization from infective endocarditis;  R13.10-Dysphagia, unspecified; Z74.09-Other reduced mobility       COMPARISON: 6/26/2021.      TECHNIQUE: Grayscale and Doppler examination for evaluation of kidneys  and renal arteries was performed. Technologist  reports best possible  images obtained due to patient confusion.     FINDINGS:  Limited exam.     Aorta: Peak systolic velocity: 95.3 cm/sec.   No aneurysm     RIGHT KIDNEY:  No hydronephrosis demonstrated.     RIGHT RENAL DOPPLER:  Right renal artery maximum peak systolic velocity: 115.4 cm/sec at  distal aspect, resistive index 0.79.  Right Renal aortic ratio: 1.2     LEFT KIDNEY:  No hydronephrosis demonstrated.      LEFT RENAL DOPPLER:  Left renal artery maximum Peak systolic velocity: 163.8 cm/sec at the  distal aspect, resistive index 0.84  Left Renal aortic ratio: 1.7     There appears to be venous flow at the bilateral kidneys on static color  images, although the renal veins are not discretely interrogated on this  exam.          Impression:      1. Limited exam.  2. Bilateral renal arteries patent.  This report was finalized on 06/28/2021 14:06 by Dr Rossy Elizabeth MD.          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    Treating as if patient has aortic valve endocarditis with Enterococcus faecalis    Notes indicate mitral valve vegetation as well.    Previous stroke with middle cerebral artery distribution    Status post thromboembolectomy of right upper extremity (brachial and radial arteries) and left lower extremity (popliteal artery) on June 16 per Dr. Tian at  [place]    Enterococcal faecalis bacteremia-.  Patient only had 2 of 4 bottles positive for Enterococcus faecalis June 16th.    Follow-up cultures at [facility], [place] negative    April CT scan with splenic infarction and left kidney infarction    Right pleural effusion status post pigtail catheter placement June 28    Acute kidney injury -worsening - urine eos =0        RECOMMENDATION:   Continue current antibiotics for presumptive enterococcal aortic valve endocarditis. Mitral valve vegetation as well?.  Pharmacy assisting with dose adjustment due to renal function    Palliative care discussions with family regarding goals of care especially given worsening kidney function        Yulissa Murguia MD  07/01/21  08:29 CDT

## 2021-07-01 NOTE — THERAPY DISCHARGE NOTE
Acute Care - Speech Language Pathology Discharge Summary  [facility]       Patient Name: Valerie Kay  : 1975  MRN: [id number]    Today's Date: 2021                   Admit Date: 2021      SLP Recommendation and Plan  Patient family has decided to pursue comfort measures only. SLP signing off at this time. Was on puree/NT as safest PO option.       Visit Dx:    ICD-10-CM ICD-9-CM   1. Dysphagia, unspecified type  R13.10 787.20   2. Impaired mobility  Z74.09 799.89         Time Calculation- SLP     Row Name 21 1341             Time Calculation- SLP    SLP Start Time  1335  -MM      SLP Stop Time  1343  -MM      SLP Time Calculation (min)  8 min  -MM      SLP Non-Billable Time (min)  8 min  -MM      SLP Received On  21  -MM        User Key  (r) = Recorded By, (t) = Taken By, (c) = Cosigned By    Initials Name Provider Type    Alison Kaur MS CCC-SLP Speech and Language Pathologist            SLP GOALS     Row Name 21 1335 21 1504 21 1040       Oral Nutrition/Hydration Goal 1 (SLP)    Oral Nutrition/Hydration Goal 1, SLP  Pt will tolerate LRD w/o any overt s/s of aspiration.  -MM  Pt will tolerate LRD w/o any overt s/s of aspiration.  -BN  Pt will tolerate LRD w/o any overt s/s of aspiration.  -BN    Time Frame (Oral Nutrition/Hydration Goal 1, SLP)  by discharge  -MM  by discharge  -BN  by discharge  -BN    Barriers (Oral Nutrition/Hydration Goal 1, SLP)  n/a  -MM  n/a  -BN  n/a  -BN    Progress/Outcomes (Oral Nutrition/Hydration Goal 1, SLP)  goal no longer appropriate  -MM  continuing progress toward goal  -BN  goal ongoing  -BN      User Key  (r) = Recorded By, (t) = Taken By, (c) = Cosigned By    Initials Name Provider Type    Liliam Ruiz, CCC-SLP Speech and Language Pathologist    Alison Kaur MS CCC-SLP Speech and Language Pathologist                  SLP Discharge Summary  Anticipated Discharge Disposition (SLP): unknown  Reason  for Discharge: no further expectation of functional progress, patient/family request discontinuation of services  Progress Toward Achieving Short/long Term Goals: goals partially met within established timelines, unable to make functional progress at this time  Discharge Destination: other (see comments) (Remains in acute care on comfort measures)      Alison Hutton, MS CCC-SLP  7/1/2021

## 2021-07-01 NOTE — DISCHARGE PLACEMENT REQUEST
"Joshua Jane (45 y.o. Female)     Date of Birth Social Security Number Address Home Phone MRN    1975  [address] [facility] [address] [phone] [id number]    Yarsanism Marital Status          Orthodox        Admission Date Admission Type Admitting Provider Attending Provider Department, Room/Bed    6/21/21 Urgent Shane Baird DO Hancock, John C, DO [facility] INTENSIVE CARE, I003/1    Discharge Date Discharge Disposition Discharge Destination                       Attending Provider: Shane Baird DO    Allergies: Metformin, Levaquin [Levofloxacin], Prednisone, Biaxin [Clarithromycin]    Isolation: None   Infection: None   Code Status: No CPR    Ht: 165.1 cm (65\")   Wt: 76.9 kg (169 lb 8 oz)    Admission Cmt: None   Principal Problem: Acute bacterial endocarditis [I33.0]                 Active Insurance as of 6/21/2021     Primary Coverage     Payor Plan Insurance Group Employer/Plan Group    [place] [id number]     Payor Plan Address Payor Plan Phone Number Payor Plan Fax Number Effective Dates    [employer] [address] [address] [phone]  4/1/2020 - 6/30/2021    [facility] [id number]       Subscriber Name Subscriber Birth Date Member ID       JOSHUA JANE 1975 [id number]                 Emergency Contacts      (Rel.) Home Phone Work Phone Mobile Phone    [employer] (Power of ) [phone] -- [phone]              Current Facility-Administered Medications   Medication Dose Route Frequency Provider Last Rate Last Admin   • acetaminophen (TYLENOL) 160 MG/5ML solution 650 mg  650 mg Oral Q4H PRN Cristino Holguin MD       • acetaminophen (TYLENOL) suppository 650 mg  650 mg Rectal Q4H PRN Cristino Holguin MD       • acetaminophen (TYLENOL) tablet 650 mg  650 mg Oral Q4H PRN Cristino Holguin MD       • ampicillin 2 g/100 mL 0.9% NS (MBP)  2 g Intravenous Q8H Boo Pérez MD   2 g at " 07/01/21 0857   • atorvastatin (LIPITOR) tablet 20 mg  20 mg Oral Daily Cristino Holguin MD   20 mg at 07/01/21 0822   • cefTRIAXone (ROCEPHIN) 2 g/100 mL 0.9% NS IVPB (MBP)  2 g Intravenous Q24H Boo Pérez MD   2 g at 06/30/21 1207   • dextrose (D50W) 25 g/ 50mL Intravenous Solution 25 g  25 g Intravenous Q15 Min PRN Cristino Holguin MD       • dextrose (GLUTOSE) oral gel 15 g  15 g Oral Q15 Min PRN Cristino Holguin MD       • enoxaparin (LOVENOX) syringe 30 mg  30 mg Subcutaneous Q24H Sergio Man DO   30 mg at 06/30/21 1705   • gabapentin (NEURONTIN) capsule 100 mg  100 mg Oral Q8H Tanya Luo APRN   100 mg at 07/01/21 0627   • glucagon (human recombinant) (GLUCAGEN DIAGNOSTIC) injection 1 mg  1 mg Subcutaneous Q15 Min PRN Cristino Holguin MD       • HYDROmorphone (DILAUDID) injection 1 mg  1 mg Intravenous Q2H PRN Cristino Holguin MD   1 mg at 07/01/21 1334   • hydrOXYzine (ATARAX) tablet 25 mg  25 mg Oral Q6H PRN Cristino Holguin MD       • insulin lispro (humaLOG) injection 0-9 Units  0-9 Units Subcutaneous 4x Daily With Meals & Nightly Cristino Holguin MD   2 Units at 07/01/21 1000   • ipratropium-albuterol (DUO-NEB) nebulizer solution 3 mL  3 mL Nebulization Q6H - RT Cristino Holguin MD   3 mL at 07/01/21 1244   • lamoTRIgine (LaMICtal) tablet 100 mg  100 mg Oral Daily Tanya Luo APRN   100 mg at 07/01/21 0822   • LORazepam (ATIVAN) injection 1 mg  1 mg Intravenous Q4H PRN Cristino Holguin MD   1 mg at 07/01/21 0848   • metoprolol tartrate (LOPRESSOR) tablet 25 mg  25 mg Oral Q12H Ida Bonilla APRN   25 mg at 06/30/21 2037   • ondansetron (ZOFRAN) injection 4 mg  4 mg Intravenous Q6H PRN Cristino Holguin MD   4 mg at 06/30/21 0953   • ondansetron (ZOFRAN) tablet 4 mg  4 mg Oral Q6H PRN Cristino Holguin MD       • oxyCODONE-acetaminophen (PERCOCET) 7.5-325 MG per tablet 1 tablet  1 tablet Oral Q4H PRN Chelsie  Cristino Mosher MD       • Pharmacy Consult - Pharmacy to dose   Does not apply Continuous PRN Yulissa Murguia MD       • QUEtiapine XR (SEROquel XR) 24 hr tablet 200 mg  200 mg Oral Nightly Cristino Holguin MD   200 mg at 06/30/21 2037   • sennosides-docusate (PERICOLACE) 8.6-50 MG per tablet 1 tablet  1 tablet Oral BID Cristino Holguin MD   1 tablet at 07/01/21 0822   • sodium chloride 0.9 % flush 10 mL  10 mL Intravenous Q12H Cristino Holguin MD   10 mL at 07/01/21 0823   • sodium chloride 0.9 % flush 10 mL  10 mL Intravenous PRN Cristino Holguin MD       • traMADol (ULTRAM) tablet 50 mg  50 mg Oral Q8H PRN Cristino Holguin MD

## 2021-07-02 VITALS
RESPIRATION RATE: 16 BRPM | BODY MASS INDEX: 28.24 KG/M2 | OXYGEN SATURATION: 98 % | DIASTOLIC BLOOD PRESSURE: 48 MMHG | HEART RATE: 88 BPM | HEIGHT: 65 IN | SYSTOLIC BLOOD PRESSURE: 113 MMHG | WEIGHT: 169.5 LBS | TEMPERATURE: 97.8 F

## 2021-07-02 PROCEDURE — 99231 SBSQ HOSP IP/OBS SF/LOW 25: CPT | Performed by: NURSE PRACTITIONER

## 2021-07-02 PROCEDURE — 25010000002 MORPHINE PER 10 MG: Performed by: CLINICAL NURSE SPECIALIST

## 2021-07-02 PROCEDURE — 94799 UNLISTED PULMONARY SVC/PX: CPT

## 2021-07-02 RX ORDER — SCOLOPAMINE TRANSDERMAL SYSTEM 1 MG/1
1 PATCH, EXTENDED RELEASE TRANSDERMAL
Start: 2021-07-02

## 2021-07-02 RX ORDER — MORPHINE SULFATE 100 MG/5ML
5 SOLUTION ORAL
Qty: 30 ML | Refills: 0 | Status: SHIPPED | OUTPATIENT
Start: 2021-07-02

## 2021-07-02 RX ORDER — LORAZEPAM 2 MG/ML
0.5 CONCENTRATE ORAL EVERY 6 HOURS PRN
Qty: 30 ML | Refills: 0 | Status: SHIPPED | OUTPATIENT
Start: 2021-07-02

## 2021-07-02 RX ADMIN — LORAZEPAM 1 MG: 2 SOLUTION, CONCENTRATE ORAL at 00:11

## 2021-07-02 RX ADMIN — MORPHINE SULFATE 4 MG: 4 INJECTION, SOLUTION INTRAMUSCULAR; INTRAVENOUS at 00:49

## 2021-07-02 NOTE — PROGRESS NOTES
"Patient name: Valerie Kay  Patient : 1975  VISIT # [id number]  MR #[id number]    Procedure:  Procedure Date:  POD:* No surgery found *    Subjective   CC: shortness of breath, endocarditis     Family conference yesterday and patient has been made comfort care with plans to return to nursing home today.  Currently resting in bed sleeping tolerating 1 liter nasal cannula.  No family at bedside.      ROS: no fevers or chills     Objective     Visit Vitals  /64 (BP Location: Left arm, Patient Position: Lying)   Pulse 82   Temp 98 °F (36.7 °C) (Axillary)   Resp 16   Ht 165.1 cm (65\")   Wt 76.9 kg (169 lb 8 oz)   LMP  (LMP Unknown)   SpO2 93%   Breastfeeding No   BMI 28.21 kg/m²       Intake/Output Summary (Last 24 hours) at 2021 09  Last data filed at 2021 0600  Gross per 24 hour   Intake 100 ml   Output 1520 ml   Net -1420 ml     RCT: 1070 ml/24 hours, no air leak    Lab:        IMAGES:       Imaging Results (Last 24 Hours)     ** No results found for the last 24 hours. **          Physical Exam:  General: Resting in bed sleeping. Tolerating nasal cannula.   Cardiovascular: Regular rate and rhythm with murmur.     Pulmonary: Diminished breath sounds bilaterally.    Abdomen: Soft, non distended, and nontender.   Extremities: Warm, moves all extremities. Mild lower extremity edema  Neurologic:  Grossly intact with no focal deficits.         Impression:  Aortic valve endocarditis  History of bacteremia, enterococcus faecalis   Recent CVA  Type 1 diabetes mellitus  Hyperlipidemia  COPD  Seizures  Bipolar disorder  PVD with recent embolic occlusion of the left upper extremity and popliteal embolus resulting in embolectomy       Plan:  Right chest tube removed without remark  Please do not hesitate to call if we can be of further assistance.    Ida Bonilla, APRN  21  09:21 CDT      "

## 2021-07-02 NOTE — NURSING NOTE
Pt has been comfortably resting throughout the morning, responds to voice most times. Report called to [facility] to transport pt back to facility with comfort measures in place. EMS notified of transport.

## 2021-07-02 NOTE — THERAPY DISCHARGE NOTE
Acute Care - Physical Therapy Discharge Summary  [facility]       Patient Name: Valerie Kay  : 1975  MRN: [id number]    Today's Date: 2021                 Admit Date: 2021      PT Recommendation and Plan    Visit Dx:    ICD-10-CM ICD-9-CM   1. Dysphagia, unspecified type  R13.10 787.20   2. Impaired mobility  Z74.09 799.89   3. End of life care  Z51.5 V66.7               PT Rehab Goals     Row Name 21 1434             Bed Mobility Goal 1 (PT)    Activity/Assistive Device (Bed Mobility Goal 1, PT)  sit to supine/supine to sit  -AB      [place] Level/Cues Needed (Bed Mobility Goal 1, PT)  standby assist  -AB      Time Frame (Bed Mobility Goal 1, PT)  long term goal (LTG);10 days  -AB      Progress/Outcomes (Bed Mobility Goal 1, PT)  goal not met  -AB         Transfer Goal 1 (PT)    Activity/Assistive Device (Transfer Goal 1, PT)  sit-to-stand/stand-to-sit;bed-to-chair/chair-to-bed  -AB      [place] Level/Cues Needed (Transfer Goal 1, PT)  minimum assist (75% or more patient effort)  -AB      Time Frame (Transfer Goal 1, PT)  long term goal (LTG);10 days  -AB      Progress/Outcome (Transfer Goal 1, PT)  goal not met  -AB         Gait Training Goal 1 (PT)    Activity/Assistive Device (Gait Training Goal 1, PT)  gait (walking locomotion);assistive device use;decrease fall risk;improve balance and speed;increase endurance/gait distance  -AB      [place] Level (Gait Training Goal 1, PT)  minimum assist (75% or more patient effort)  -AB      Distance (Gait Training Goal 1, PT)  50  -AB      Time Frame (Gait Training Goal 1, PT)  long term goal (LTG);10 days  -AB      Progress/Outcome (Gait Training Goal 1, PT)  goal not met  -AB        User Key  (r) = Recorded By, (t) = Taken By, (c) = Cosigned By    Initials Name Provider Type Discipline    AB Yelitza James, PTA Physical Therapy Assistant PT              PT Discharge Summary  Anticipated Discharge Disposition (PT): skilled nursing  facility  Reason for Discharge: Discharge from facility  Outcomes Achieved: Refer to plan of care for updates on goals achieved  Discharge Destination: SNF      Yelitza James, PTA   7/2/2021

## 2021-07-02 NOTE — CASE MANAGEMENT/SOCIAL WORK
Case Management Discharge Note      Final Note: NOTIFIED UMBERTO AT [facility] OF PT'S D/C TODAY.  PT. WILL RETURN TO THE HOSPICE LEVEL OF CARE.  TIFFANY AT [facility] AWARE ALSO.  NURSE, PLEASE CALL REPORT [phone]. I FAXED [facility] AND NH HAS ACCESS TO [facility] SO NO NEED TO FAX D/C SUMMARY.         Selected Continued Care - Admitted Since 6/21/2021     Destination Coordination complete    Service Provider Selected Services Address Phone Fax Patient Preferred    [facility]  Skilled Nursing [address] [place] [id number] [phone] --          Durable Medical Equipment    No services have been selected for the patient.              Dialysis/Infusion    No services have been selected for the patient.              Home Medical Care    No services have been selected for the patient.              Therapy    No services have been selected for the patient.              Community Resources Coordination complete    Service Provider Selected Services Address Phone Fax Patient Preferred    [facility]  Home Hospice [address], [facility] [address] [phone] --          Community & DME    No services have been selected for the patient.                       Final Discharge Disposition Code: 04 - intermediate care facility

## 2021-07-02 NOTE — DISCHARGE SUMMARY
[facility] Medicine Services  DISCHARGE SUMMARY       Date of Admission: 6/21/2021  Date of Discharge:  7/2/2021  Primary Care Physician: Provider, No Known    Presenting Problem/History of Present Illness:  Transferred from [facility] for cardiothoracic surgery evaluation.     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  Dr. Velasquez with cardiothoracic surgery.  2.  Dr. Holguin with vascular surgery.  3.  Dr. Mckeon and Dr. Mata with neurology.  4.  Dr. Murguia with infectious disease.  5.  Dr. Marie and Dr. Starks with nephrology.  6.  CHRISTINE Victor with palliative care.    Procedures Performed: Right-sided chest tube placed by Dr. Velasquez and subsequently removed.    Pertinent Test Results:   Results for orders placed during the hospital encounter of 06/21/21    Adult Transthoracic Echo Limited W/ Cont if Necessary Per Protocol    Interpretation Summary  · Left ventricular ejection fraction appears to be 61 - 65%.  · There is a moderate sized, mobile vegetation on the aortic valve.  · Moderate aortic valve regurgitation is present.  · Moderate mitral valve regurgitation is present with an eccentric jet noted.  · Suspected mitral valve endocarditis of the anterior leaflet  · Estimated right ventricular systolic pressure from tricuspid  regurgitation is moderately elevated (45-55 mmHg).  · Moderate pulmonary hypertension is present.    Imaging Results (All)     Procedure Component Value Units Date/Time    XR Chest 1 View [[id number]] Collected: 07/01/21 0708     Updated: 07/01/21 0713    Narrative:      EXAMINATION: XR CHEST 1 VW- 7/1/2021 7:08 AM CDT     HISTORY: Right pleural effusion     COMPARISON: 6/30/2021     FINDINGS:  Heart and mediastinal contours appear normal. A small caliber chest tube  is in place with tip projecting over the medial right lung base. There  is a layering left pleural effusion. Diffuse perihilar interstitial  opacities are evident. There is no appreciable pneumothorax.       Impression:      Minimal interval decrease in the degree of pulmonary edema.  Persistent layering left pleural effusion and atelectasis.  This report was finalized on 07/01/2021 07:10 by Dr. Haja Rodrigues MD.    XR Chest 1 View [[id number]] Collected: 06/30/21 0711     Updated: 06/30/21 0716    Narrative:      EXAM: XR CHEST 1 VW- 6/30/2021 4:15 AM CDT     HISTORY: pleural effusion right; R13.10-Dysphagia, unspecified;  Z74.09-Other reduced mobility       COMPARISON: June 29, 2021 at 8:00 AM.     TECHNIQUE: Frontal radiograph of the chest     FINDINGS:   Small-caliber right chest tube is present. Right lung is expanded the  chest wall. There is a moderate to large left pleural effusion. Left  lower lobe is obscured.. Pulmonary vascular margins are slightly  indistinct this may be associated with mild congestive failure Cardiac  silhouette is normal..      The osseous structures and surrounding soft tissues demonstrate no acute  abnormality.          Impression:      1. Large left pleural effusion  2. Small caliber right chest tube is present in the right lung is  expanded to the chest wall..     3. Indistinct pulmonary vascular margins most likely representing  pulmonary vascular congestion     This report was finalized on 06/30/2021 07:13 by   Wyatt Carroll MD.    XR Chest 1 View [[id number]] Collected: 06/29/21 0825     Updated: 06/29/21 0829    Narrative:      EXAM: XR CHEST 1 VW- 6/29/2021 8:05 AM CDT     HISTORY: pleural effusion right; R13.10-Dysphagia, unspecified;  Z74.09-Other reduced mobility       COMPARISON: June 28, 2021.     TECHNIQUE: Frontal radiograph of the chest     FINDINGS:   A small caliber right chest tube is present. The right lung appears  expanded to the chest wall. Mild opacity in the right infrahilar region  is present. This may be atelectasis.     A moderate left pleural fluid collection is present. The left diaphragms  indistinct and there may be atelectasis in the left lower lobe.. Cardiac  silhouette is normal in size. Slightly shifted from right to left  associated with volume loss in the left lower lobe..      The osseous structures and surrounding soft tissues demonstrate no acute  abnormality.          Impression:      1. Small-caliber right chest tube is present with the right lung is  expanded the chest wall.        2. Opacity right infrahilar region. This may be atelectasis or focal  inflammatory process.  3. Left pleural effusion with atelectasis left lower lobe.        This report was finalized on 06/29/2021 08:26 by Dr. Wyatt Carroll MD.    XR Abdomen KUB [[id number]] Collected: 06/29/21 0720     Updated: 06/29/21 0724    Narrative:      XR ABDOMEN KUB- 6/29/2021 3:05 AM CDT     HISTORY: Check dobhoff placement; R13.10-Dysphagia, unspecified;  Z74.09-Other reduced mobility       COMPARISON: None     FINDINGS:  Moderate left pleural effusion is present.. Dobbhoff tube is coiled in  the esophagus..     No acute skeletal abnormality is identified.        Impression:      1. Dobbhoff tube is coiled in the esophagus and directed to the pharynx.  2. Moderate left pleural effusion.         This report was finalized on 06/29/2021 07:21 by Dr. Wyatt Carroll MD.    XR Chest 1 View [[id number]] Collected: 06/28/21 2101      Updated: 06/28/21 2112    Narrative:      EXAMINATION: XR CHEST 1 VW- 6/28/2021 9:08 PM CDT     HISTORY: post chest tube; R13.10-Dysphagia, unspecified; Z74.09-Other  reduced mobility.     REPORT: A frontal view of the chest was obtained.     COMPARISON: Chest x-rays 6/28/2021 0952 hours.     The lungs are hypoaerated as before, there is consolidation of the left  lung base which is unchanged, with probable atelectasis and effusion,  though pneumonia is not excluded. There is a new smallbore right chest  tube at the right base, this appears to be in satisfactory position. No  pneumothorax is identified. There is mild improvement in aeration of the  right lung base with probable decrease in effusion. Heart size appears  to be normal. There is central vascular congestion and interstitial  edema, with mild improvement. No other change.       Impression:      Interval insertion of a smallbore right basilar chest tube  in good position without pneumothorax. Decrease in right pleural  effusion and improvement in aeration of the right lung is noted. The  left lung is unchanged with left basilar consolidation and there is  persistent central edema.  This report was finalized on 06/28/2021 21:09 by Dr. Hamzah Borja MD.    US Renal Artery Complete [[id number]] Collected: 06/28/21 1359     Updated: 06/28/21 1409    Narrative:      EXAM: US RENAL ARTERY COMPLETE- - 6/28/2021 11:02 AM CDT     HISTORY: Possible embolization from infective endocarditis;  R13.10-Dysphagia, unspecified; Z74.09-Other reduced mobility       COMPARISON: 6/26/2021.      TECHNIQUE: Grayscale and Doppler examination for evaluation of kidneys  and renal arteries was performed. Technologist reports best possible  images obtained due to patient confusion.     FINDINGS:  Limited exam.     Aorta: Peak systolic velocity: 95.3 cm/sec.   No aneurysm     RIGHT KIDNEY:  No hydronephrosis demonstrated.     RIGHT RENAL DOPPLER:  Right renal artery maximum peak  systolic velocity: 115.4 cm/sec at  distal aspect, resistive index 0.79.  Right Renal aortic ratio: 1.2     LEFT KIDNEY:  No hydronephrosis demonstrated.      LEFT RENAL DOPPLER:  Left renal artery maximum Peak systolic velocity: 163.8 cm/sec at the  distal aspect, resistive index 0.84  Left Renal aortic ratio: 1.7     There appears to be venous flow at the bilateral kidneys on static color  images, although the renal veins are not discretely interrogated on this  exam.          Impression:      1. Limited exam.  2. Bilateral renal arteries patent.  This report was finalized on 06/28/2021 14:06 by Dr Rossy Elizabeth MD.    XR Chest 1 View [[id number]] Collected: 06/28/21 1021     Updated: 06/28/21 1027    Narrative:      EXAM: XR CHEST 1 VW- - 6/28/2021 10:01 AM CDT     HISTORY: f/u respiratory distress; R13.10-Dysphagia, unspecified;  Z74.09-Other reduced mobility       COMPARISON: 6/27/2021.      TECHNIQUE:  1 images.  Frontal view of the chest.     FINDINGS:    Similar bibasilar opacities and effusion. Similar perihilar opacities.  No pneumothorax. Cardiac mediastinal silhouette similar to prior. No  acute bony finding.          Impression:      1. Similar perihilar and bibasilar opacities with probable effusions.  This report was finalized on 06/28/2021 10:24 by Dr Rossy Elizabeth MD.    XR Chest 1 View [[id number]] Collected: 06/27/21 1741     Updated: 06/27/21 1747    Narrative:      EXAMINATION: XR CHEST 1 VW- 6/27/2021 5:41 PM CDT     HISTORY: resp distress; R13.10-Dysphagia, unspecified; Z74.09-Other  reduced mobility.     REPORT: A frontal view of the chest was obtained.     COMPARISON: Chest x-ray 6/23/2021.     There is persistent consolidation of the left lung base, increased  infiltrate is seen in the right lung now there appear to be bilateral  pleural effusions and central vascular congestion and pulmonary edema.  The heart margins are mostly obscured. No pneumothorax is identified. No  acute osseous  abnormality is identified.       Impression:      Increased volume loss in the lung bases and increase in  volume of the pleural effusions, with central vascular congestion and  pulmonary edema. This is compatible with worsening volume overload.  There is persistent consolidation of the left lung base and pneumonia  cannot be excluded.  This report was finalized on 06/27/2021 17:44 by Dr. Hamzah Borja MD.    US Renal Bilateral [[id number]] Collected: 06/26/21 0956     Updated: 06/26/21 1004    Narrative:      EXAM: US RENAL BILATERAL-     INDICATION: LUIZ; R13.10-Dysphagia, unspecified; Z74.09-Other reduced  mobility     COMPARISON: None available.     FINDINGS:     RIGHT kidney measures 12 cm in length. LEFT kidney measures 10.9 cm in  length. Both kidneys demonstrate normal cortical thickness and  echogenicity. No shadowing calculi or hydronephrosis. No focal urinary  bladder wall thickening.       Impression:         Negative for hydronephrosis or renal atrophy.  This report was finalized on 06/26/2021 10:01 by Dr. Ry Shin MD.    CT Angiogram Coronary [[id number]] Resulted: 06/25/21 1910     Updated: 06/25/21 1927    Narrative:       Procedure: CT angiography of the coronary arteries with intravenous   contrast including 3D image postprocessing including evaluation of current   cardiac structure and morphology with coronary artery calcium scoring       ______________________________________________________________________  Acquisition mode:  Prospective ECG triggering   Contrast type and volume:  Isovue   Medication used:  Nitroglycerin beta-blocker therapy when indicated   complications: None immediate  Image quality: Satisfactory ,   Some misregistration artifacts noted. This decreases overall accuracy of   the test  Signal to noise.  Satisfactory  Scanner and settings:Siemens force dual source CT scanner using iterative   reconstruction and prospective gating  Preliminary  study was obtained  followed by coronary artery calcium   scoring procedure.    Following administration of contrast collimated images were obtained   through the coronary arteries.    Data was transferred offline for 3D reconstruction including curved   multiplanar reformatting and multiplanar imaging.  Indication: Low to moderate suspicion of coronary artery disease   ALARA follow to minimize radiation  ______________________________________________________________________  Coronary Calcium (Agatston):  0  _____________________________________________________________________    Coronary angiography:     Left Main: The left main is a normal caliber vessel with a normal take off   from the left coronary cusp that   bifurcates to form a left anterior descending artery and a left circumflex   artery.  Left main coronary artery is normal     Left anterior descending artery: Arises normally from the left main   coronary artery and gives off septal and diagonal branches.  Proximal left   anterior descending coronary artery does not have any significant disease.    Left circumflex artery: Arises normally from the left main coronary artery   and gives off obtuse marginal branches.  Proximal left circumflex coronary   artery does not have any significant disease.    Right coronary artery: Arises normally from the right coronary cusp.  It   bifurcates into the right posterior descending coronary artery and the   right posterior lateral arteries.  Right coronary artery is dominant.  Proximal right coronary artery does   not have any significant stenotic disease.    Right atrium: Normal size  No filling defects noted    Right ventricle: Normal size   No filling defects noted.    Left Atrium:  Left atrial size is normal in size  No left atrial appendage filling defect   Left Ventricle: The ventricular cavity size is within normal limits. There   are no stigmata of prior infarction. There is no abnormal filling defect.     Pulmonary arteries:  Normal in size without proximal filling defect     Pulmonary veins: Normal pulmonary venous drainage. There were four  Noted   pulmonary veins, two on the right and two on the left.     Pericardium:  Normal thickness with no significant effusion or calcium   present.      Cardiac valves: Mild thickening of aortic valve leaflets noted.    Aorta: Normal caliber.    Extra-cardiac findings: Minor bony changes of the spine noted     ______________________________________________________________________      Cardiac CT angiography 6/25/2021    Impression:     1. Total calcium score : 0  2.  No significant disease noted of the left main coronary artery,   proximal left anterior descending coronary artery, proximal left   circumflex coronary artery and proximal right coronary artery.  3.  Substantial degradation of images due to motion artifacts and cannot   assess the mid to distal portion of epicardial vessels.  4.  Limited gated scan shows preserved biventricular function with   hypokinesis of the apical free wall of the right ventricle  5.  Suggestion of thickening of aortic valve leaflets.  Unfortunately   coronary CT angiography is suboptimal for visualization of vegetations   from endocarditis.  6.  Consolidation of left lung base noted.  Interstitial edema of the lung   noted.  Left pleural effusion noted.    ___________________________________________________________________________      Recommendations:    Patient currently admitted and undergoing further work-up.  Further evaluation if ongoing chest pain or high risk of suspicion of   significant ischemic heart disease  May consider radiology overread of noncardiac findings        US Abdomen Limited [[id number]] Collected: 06/24/21 1407     Updated: 06/24/21 1413    Narrative:      HISTORY: Right upper quadrant pain     Right upper quadrant ultrasound: Sonographic imaging the right upper  quadrant is performed.     Limited images the pancreas are unremarkable.  Visible abdominal aorta is  normal in caliber. Proximal IVC is patent. Coarse echogenic liver  parenchyma may indicate hepatic steatosis. No focal liver lesion  identified. Appropriate directional flow within the portal vein. Minimal  biliary sludge considered with no gallstones. No gallbladder wall  thickening pericholecystic fluid. Common bile duct is normal at 5 mm.     Right kidney appears normal measuring 11.8 cm in length. No right-sided  hydronephrosis.     Incidental note made of small right pleural effusion.       Impression:      1. Questionable biliary sludge with no discrete gallstones. No biliary  dilatation. No sonographic evidence of acute cholecystitis.  2. Small right pleural effusion.  3. Mild hepatic steatosis.  This report was finalized on 06/24/2021 14:10 by Dr. Melissa Mcneil MD.    XR Chest 1 View [[id number]] Collected: 06/23/21 1639     Updated: 06/23/21 1644    Narrative:      EXAMINATION: XR CHEST 1 VW- 6/23/2021 4:39 PM CDT     HISTORY: dyspnea; R13.10-Dysphagia, unspecified; Z74.09-Other reduced  mobility.     REPORT: A frontal view of the chest was obtained.     COMPARISON: Chest x-ray 6/21/2021.     The lungs are grossly hypoaerated, there are persistent and slightly  increased central and basilar interstitial infiltrates and partial  consolidation of the left lower lobe with a small effusion. The left  heart margin is partially obscured. No cardiomegaly seen however. No  pneumothorax is identified. The osseous structures and upper abdomen are  unremarkable.       Impression:      Pulmonary hypoventilation with persistent changes of  interstitial edema and probable volume overload, with slight interval  increase. There is also consolidation left lung base as before and  underlying pneumonia cannot be excluded. There appears to be a small  persistent left pleural effusion.  This report was finalized on 06/23/2021 16:41 by Dr. Hamzah Borja MD.    MRI Brain Without Contrast [[id number]]  Collected: 06/22/21 1647     Updated: 06/22/21 1654    Narrative:      Indication: Right-sided weakness        Technique: Multisequence, multiplanar MRI of the brain without contrast.     Comparison: None     Findings:      Evolving right MCA territory infarct, subacute to chronic in its time  course with facilitated diffusion and T2 shine through. No acute  ischemia. Developing encephalomalacia within this territory. No  associated hemorrhage or mass effect.     No intra-axial or extra-axial hemorrhage. The ventricles, cortical sulci  and basal cisterns are symmetric and age appropriate. Posterior fossa  structures are unremarkable. Pituitary gland and sella are unremarkable.  The major intracranial flow-voids are preserved. Orbital contents are  unremarkable. The paranasal sinuses are clear. Left mastoid effusion.  Normal bone marrow signal.        Impression:      Impression:     1. No acute ischemia.  2. Subacute-to-chronic right MCA territory superior division infarct  demonstrating facilitated diffusion, T2 shine through and developing  encephalomalacia.  This report was finalized on 06/22/2021 16:51 by Dr Rudy Chen, .    XR Chest 1 View [[id number]] Collected: 06/21/21 2155     Updated: 06/21/21 2200    Narrative:      EXAMINATION: Chest 1 view 6/21/2021     HISTORY: Admission.     FINDINGS: There is left lower lobe consolidation suggesting pneumonia.  There is an element of interstitial lung disease within both lungs with  Kerley B lines within the lower lobes. An element of pulmonary venous  hypertension with interstitial edema is not excluded. There is a  left-sided effusion with blunting of the costophrenic angle.       Impression:      1.. Left lower lobe consolidation with partial silhouetting of the left  diaphragm. A small left effusion is present.  2. Prominent interstitial markings within the lung bases, particularly  on the left. An element of pulmonary venous hypertension and mild  interstitial  edema is not excluded.  This report was finalized on 06/21/2021 21:57 by Dr. Alexander Pimentel MD.    XR Abdomen KUB [[id number]] Collected: 06/21/21 2154     Updated: 06/21/21 2158    Narrative:      EXAMINATION: KUB radiograph 6/21/2021     HISTORY: Admission.     FINDINGS: KUB radiograph demonstrates a nonspecific bowel gas pattern  with gas in both small bowel and colon. There is no obstruction or free  air. There is mild constipation. There is degenerative change of the mid  and lower lumbar spine. The patient is status post bilateral hip  arthroplasties.       Impression:      . Nonspecific bowel gas pattern. There is no obstruction or  free air.  This report was finalized on 06/21/2021 21:55 by Dr. Alexander Pimentel MD.        LAB RESULTS:      Lab 07/01/21 0224 06/30/21 0318 06/28/21 0223 06/27/21 0226 06/26/21  0346   WBC 9.63 11.34* 13.83* 10.40 10.03   HEMOGLOBIN 10.3* 9.7* 9.9* 8.8* 9.4*   HEMATOCRIT 32.9* 30.7* 31.6* 27.8* 30.2*   PLATELETS 570* 568* 530* 512* 558*   NEUTROS ABS  --   --  9.06* 6.67 5.92   IMMATURE GRANS (ABS)  --   --  0.13* 0.04 0.04   LYMPHS ABS  --   --  3.50* 2.77 3.03   MONOS ABS  --   --  1.00* 0.80 0.94*   EOS ABS  --   --  0.00 0.00 0.00   MCV 84.4 83.9 86.1 84.5 84.8         Lab 07/01/21 0224 06/30/21 0318 06/29/21  0330 06/28/21 0223 06/27/21 0226 06/26/21  1404   SODIUM 144 143 142 140 140  --    POTASSIUM 3.3* 3.1* 3.6 4.9 3.3*  --    CHLORIDE 104 104 101 104 104  --    CO2 25.0 24.0 23.0 20.0* 25.0  --    ANION GAP 15.0 15.0 18.0* 16.0* 11.0  --    BUN 30* 27* 23* 19 16  --    CREATININE 4.42* 4.31* 3.93* 3.37* 2.65*  --    GLUCOSE 141* 144* 154* 111* 160*  --    CALCIUM 10.1 10.0 10.5 10.2 10.5  --    MAGNESIUM 2.0 1.9  --   --   --  1.9   PHOSPHORUS 4.0 5.2*  --   --   --  4.8*         Lab 07/01/21 0224 06/27/21  0226   TOTAL PROTEIN 6.4 5.8*   ALBUMIN 2.20* 2.20*   GLOBULIN 4.2 3.6   ALT (SGPT) 10 14   AST (SGOT) 16 18   BILIRUBIN 0.2 0.2   ALK PHOS 316* 414*                      Lab 06/28/21  0625 06/27/21  1540   PH, ARTERIAL 7.304* 7.348*   PCO2, ARTERIAL 50.0* 47.8*   PO2 ART 63.7* 83.8   O2 SATURATION ART 90.2* 96.5   HCO3 ART 24.8 26.2*   BASE EXCESS ART -1.8* 0.3     Brief Urine Lab Results  (Last result in the past 365 days)      Color   Clarity   Blood   Leuk Est   Nitrite   Protein   CREAT   Urine HCG        06/26/21 1707             103.9       06/26/21 1707             103.9           Microbiology Results (last 10 days)     Procedure Component Value - Date/Time    Eosinophil Smear - Urine, Urinary Bladder [[id number]]  (Normal) Collected: 06/29/21 1404    Lab Status: Final result Specimen: Urine from Urinary Bladder Updated: 06/30/21 0437     Eosinophil Smear 0 % EOS/100 Cells     COVID PRE-OP / PRE-PROCEDURE SCREENING ORDER (NO ISOLATION) - Swab, Nasal Cavity [[id number]]  (Normal) Collected: 06/27/21 2021    Lab Status: Final result Specimen: Swab from Nasal Cavity Updated: 06/27/21 2141    Narrative:      The following orders were created for panel order COVID PRE-OP / PRE-PROCEDURE SCREENING ORDER (NO ISOLATION) - Swab, Nasal Cavity.  Procedure                               Abnormality         Status                     ---------                               -----------         ------                     COVID-19,[employer] [facility]...[[id number]]  Normal              Final result                 Please view results for these tests on the individual orders.    COVID-19,[employer] IN-HOUSE,Nasal Swab No Transport Media 3-4 HR TAT - Swab, Nasal Cavity [[id number]]  (Normal) Collected: 06/27/21 2021    Lab Status: Final result Specimen: Swab from Nasal Cavity Updated: 06/27/21 2141     COVID19 Not Detected    Narrative:      Fact sheet for providers: https://www.fda.gov/media/121869/download     Fact sheet for patients: https://www.fda.gov/media/293026/download    Test performed by PCR.    Consider negative results in combination with clinical observations, patient  history, and epidemiological information.        Hospital Course:  The patient was admitted on 6/21 by Dr. Jovel.  She was excepted as a transfer from [facility] after recently being admitted there.  She was accepted on behalf of Dr. Velasquez with the cardiothoracic surgery service.  During her admission at [place], she was found to have a vegetation of her aortic valve and also had a thrombosis of the right brachial artery that required thrombectomy by Dr. Tian. Dr. Velasquez reviewed her SUZY images and felt that she thickening of her aortic valve.  There was also some concern that her arterial thrombosis was secondary to her endocarditis.  Dr. Man cared for the patient from 6/22-6/27.     Dr. Velasquez continues to follow.  He and I have discussed the case in detail.  He wanted to repeat an echocardiogram on 6/28 to reevaluate.  Dr. Lawrence feels there is a moderate sized, mobile vegetation on the aortic valve.  Moderate aortic valve regurgitation present.  Moderate mitral valve regurgitation present with an eccentric jet noted giving suspicion to mitral valve endocarditis of the anterior leaflet. She is not a great surgical candidate at present, but he was concerned that this is what she will ultimately require.      Infectious diseases followed.  She had positive blood cultures for Enterococcus at the outlying facility.  Blood cultures here have been negative.  She continues on ampicillin and ceftriaxone at this point in time. 6 weeks of IV antibiotic therapy have been recommended. Groshong was recommended, but never placed.      She has developed problems with acute kidney injury.  She has become volume overloaded.  She has been followed by nephrology recently.  Bilateral renal ultrasound from 6/25 showed no abnormalities.  Renal function declined on a Lasix drip so it was tranisitioned to pushes on 6/29. Stopped on the morning of 6/30 with some low blood pressure. There were high concerns for  eventual dialysis needs.  Renal artery duplex examination on 6/28 was limited that showed patent bilateral renal arteries.       She had worsened from a respiratory standpoint on 6/27.  She was moved to the intensive care unit. She required BiPAP for a time.  Dr. Velasquez drained her right pleural effusion on 6/28.  This is allowed her respiratory status to improve and she remains off BiPAP since this intervention.      Vascular surgery was consulted secondary to her recent right brachial artery thrombectomy.  They are also following for the possibility of the a Groshong catheter to continue antibiotics.  This will be placed on hold at this point.  She may ultimately need a permacath for dialysis.     He has a history of type 1 diabetes that is insulin-dependent.  She is a recent hemoglobin A1c of 6.9. Recent blood glucoses are fairly well controlled.  Continue Accu-Cheks and sliding scale insulin for now.       Speech has seen several times and has cleared her for pureed foods with nectar thick liquids. Dr. Velasquez may want us to place a Dobbhoff tube for nutrition.     She has been on prophylactic Lovenox.  She has previously been on a heparin drip.     Her situation continued to be exceedingly complicated. Palliative care consulted on 6/29 with Dr. Velasquez agreeable with this. The patient is unable to make any decisions for herself at present. Her mother is her surrogate decision-maker/POA. Family meeting was done at 1 pm on 7/1. The patient's father, stepfather, mother, sister, and her son were present and agreeable to the discussion with myself, Tamie Walton, CHRISTINE Victor, and Dr. Drew Velasquez with cardiothoracic surgery. Content discussed was her recurrent state, future state, expected outcomes, the possibility of dialysis, the possibility of prolonged antibiotics versus eventual surgical intervention.  There was much discussion between the care team and the patient's family.  They feel that the patient's  "baseline at present is poor to begin with and that she would not have an appropriate outcome no matter what direction we took her Natalya.  Dr. Velasquez talked about more immediate surgery versus prolonged antibiotic therapy and waiting on eventual surgery.  We talked about her upcoming need for dialysis in all likelihood.  We talked about complications that could arise secondary to her poor nutritional status and difficulty feeding.  They would not want feeding tubes. They wanted her chest tube removed.  They do not want to do anything aggressive any longer in her care.  They wish for her to return to [facility] for hospice care.  The patient's mother states that she had much difficulty caring for her after her stroke in the home setting and could not possibly imagine being able to take care of her in her current state.  [facility] will take her today.     Physical Exam on Discharge:  /64 (BP Location: Left arm, Patient Position: Lying)   Pulse 82   Temp 98 °F (36.7 °C) (Axillary)   Resp 16   Ht 165.1 cm (65\")   Wt 76.9 kg (169 lb 8 oz)   LMP  (LMP Unknown)   SpO2 93%   Breastfeeding No   BMI 28.21 kg/m²   Physical Exam  Constitutional:       Appearance: She is well-developed.      Comments: Up in bed. Somnolent. No family present. Discussed with her nurse, Galina. She spoke with her mother this morning.   HENT:      Head: Normocephalic and atraumatic.   Eyes:      Conjunctiva/sclera: Conjunctivae normal.      Pupils: Pupils are equal, round, and reactive to light.   Neck:      Vascular: No JVD.   Cardiovascular:      Rate and Rhythm: Normal rate and regular rhythm.      Heart sounds: Murmur heard.   No friction rub. No gallop.    Pulmonary:      Breath sounds: No wheezing or rales.      Comments: On 1L.  Diminished breath sounds at the bases. Right sided thoracostomy tube removed today by CTS.   Abdominal:      General: Bowel sounds are normal. There is no distension.      Palpations: Abdomen is soft. "      Tenderness: There is no abdominal tenderness. There is no guarding or rebound.   Musculoskeletal:         General: Swelling present. No tenderness or deformity. Normal range of motion.      Cervical back: Neck supple.      Comments: Right upper extremity has staples in place from recent brachial thrombectomy.   Skin:     General: Skin is warm and dry.      Findings: No rash.   Neurological:      Mental Status: She is alert.      Cranial Nerves: No cranial nerve deficit.      Motor: Weakness (L>R) present. No abnormal muscle tone.      Deep Tendon Reflexes: Reflexes normal.   Psychiatric:      Comments: Somnolent.       Condition on Discharge: Poor prognosis and returning to the SNF on comfort measures.     Discharge Disposition:  Skilled Nursing Facility ([place] - External): [place].     Discharge Medications:     Discharge Medications      New Medications      Instructions Start Date   Glycerin-Hypromellose- 0.2-0.2-1 % solution ophthalmic solution  Commonly known as: ARTIFICIAL TEARS   1 drop, Both Eyes, Every 30 Minutes PRN      LORazepam 2 MG/ML concentrated solution  Commonly known as: ATIVAN   0.5 mg, Oral, Every 6 Hours PRN      morphine 20 MG/ML concentrated solution   5 mg, Oral, Every 3 Hours PRN      Scopolamine 1.5 MG/3DAYS patch  Commonly known as: TRANSDERM-SCOP   1 patch, Transdermal, Every 72 Hours PRN         Continue These Medications      Instructions Start Date   acetaminophen 650 MG suppository  Commonly known as: TYLENOL   650 mg, Rectal, Every 4 Hours PRN      acetaminophen 325 MG tablet  Commonly known as: TYLENOL   650 mg, Oral, Every 4 Hours PRN      lamoTRIgine 100 MG tablet  Commonly known as: LaMICtal   100 mg, Oral, Daily      QUEtiapine  MG 24 hr tablet  Commonly known as: SEROquel XR   200 mg, Oral, Nightly         Stop These Medications    Admelog 100 UNIT/ML injection  Generic drug: insulin lispro     aspirin 81 MG EC tablet     clopidogrel 75 MG tablet  Commonly  known as: PLAVIX     gabapentin 100 MG capsule  Commonly known as: NEURONTIN     lisinopril 10 MG tablet  Commonly known as: PRINIVIL,ZESTRIL     OLANZapine 5 MG tablet  Commonly known as: zyPREXA     ondansetron 8 MG tablet  Commonly known as: ZOFRAN     Semglee 100 UNIT/ML injection  Generic drug: insulin glargine     traMADol 50 MG tablet  Commonly known as: ULTRAM          Discharge Diet:   Diet Instructions     Advance Diet As Tolerated          Activity at Discharge:   Activity Instructions     Up WIth Assist          Follow-up Appointments:   None anticipated at present.     Test Results Pending at Discharge: None.     Electronically signed by Shane Baird DO, 07/02/21, 10:46 CDT.    Time: 40 minutes.

## 2021-07-02 NOTE — PLAN OF CARE
Goal Outcome Evaluation:  Plan of Care Reviewed With: patient        Progress: no change  Outcome Summary: VSS. Comfort measures continue. PRN morphine and ativan administered. Plan is to D/C to SNF on hospice today. Bed check on. Safety maintained. Will continue to monitor.

## 2021-07-02 NOTE — PLAN OF CARE
Patient was resting in bed. She appears comfortable. Nursing reports she has been resting comfortably with no no issues. Patient will be transitioning to SNF and continuing comfort measures. Anticipated discharge today. Please send MOST form with EMS.         LANETTE Angulo  7/2/2021

## 2021-07-02 NOTE — PAYOR COMM NOTE
"7/2/21 [facility]    FAX [phone]    D/C SUMMARY        Joshua Jane (45 y.o. Female)     Date of Birth Social Security Number Address Home Phone MRN    [id number]  [address] [facility] [phone] [id number]    Confucianism Marital Status          Gnosticism        Admission Date Admission Type Admitting Provider Attending Provider Department, Room/Bed    6/21/21 Urgent Shane Baird DO Hancock, John C, DO [facility] 4B, 407/1    Discharge Date Discharge Disposition Discharge Destination         Skilled Nursing Facility (DC - External)              Attending Provider: Shane Baird DO    Allergies: Metformin, Levaquin [Levofloxacin], Prednisone, Biaxin [Clarithromycin]    Isolation: None   Infection: None   Code Status: No CPR    Ht: 165.1 cm (65\")   Wt: 76.9 kg (169 lb 8 oz)    Admission Cmt: None   Principal Problem: Acute bacterial endocarditis [I33.0]                 Active Insurance as of 6/21/2021     Primary Coverage     Payor Plan Insurance Group Employer/Plan Group    [place] [id number]     Payor Plan Address Payor Plan Phone Number Payor Plan Fax Number Effective Dates    [employer] [address] [address] [phone]  4/1/2020 - 6/30/2021    [facility] [id number]       Subscriber Name Subscriber Birth Date Member ID       JOSHUA JANE 1975 [id number]                 Emergency Contacts      (Rel.) Home Phone Work Phone Mobile Phone    [employer] (Power of ) [phone] -- [phone]               Discharge Summary      Shane Baird DO at 07/02/21 [facility]              [facility]  DISCHARGE SUMMARY       Date of Admission: 6/21/2021  Date of Discharge:  7/2/2021  Primary Care Physician: Provider, No Known    Presenting Problem/History of Present Illness:  Transferred from [facility] for cardiothoracic surgery " evaluation.     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  Dr. Velasquez with cardiothoracic surgery.  2.  Dr. Holguin with vascular surgery.  3.  Dr. Mckeon and Dr. Mata with neurology.  4.  Dr. Murguia with infectious disease.  5.  Dr. Marie and Dr. Starks with nephrology.  6.  CHRISTINE Victor with palliative care.    Procedures Performed: Right-sided chest tube placed by Dr. Velasquez and subsequently removed.    Pertinent Test Results:   Results for orders placed during the hospital encounter of 06/21/21    Adult Transthoracic Echo Limited W/ Cont if Necessary Per Protocol    Interpretation Summary  · Left ventricular ejection fraction appears to be 61 - 65%.  · There is a moderate sized, mobile vegetation on the aortic valve.  · Moderate aortic valve regurgitation is present.  · Moderate mitral valve regurgitation is present with an eccentric jet noted.  · Suspected mitral valve endocarditis of the anterior leaflet  · Estimated right ventricular systolic pressure from tricuspid regurgitation is moderately elevated (45-55 mmHg).  · Moderate pulmonary hypertension is present.    Imaging Results (All)     Procedure Component Value Units Date/Time    XR Chest 1 View [[id number]] Collected: 07/01/21 0708     Updated: 07/01/21 0713    Narrative:      EXAMINATION: XR CHEST 1 VW- 7/1/2021 7:08 AM CDT     HISTORY:  Right pleural effusion     COMPARISON: 6/30/2021     FINDINGS:  Heart and mediastinal contours appear normal. A small caliber chest tube  is in place with tip projecting over the medial right lung base. There  is a layering left pleural effusion. Diffuse perihilar interstitial  opacities are evident. There is no appreciable pneumothorax.       Impression:      Minimal interval decrease in the degree of pulmonary edema.  Persistent layering left pleural effusion and atelectasis.  This report was finalized on 07/01/2021 07:10 by Dr. Haja Rodrigues MD.    XR Chest 1 View [[id number]] Collected: 06/30/21 0711     Updated: 06/30/21 0716    Narrative:      EXAM: XR CHEST 1 VW- 6/30/2021 4:15 AM CDT     HISTORY: pleural effusion right; R13.10-Dysphagia, unspecified;  Z74.09-Other reduced mobility       COMPARISON: June 29, 2021 at 8:00 AM.     TECHNIQUE: Frontal radiograph of the chest     FINDINGS:   Small-caliber right chest tube is present. Right lung is expanded the  chest wall. There is a moderate to large left pleural effusion. Left  lower lobe is obscured.. Pulmonary vascular margins are slightly  indistinct this may be associated with mild congestive failure Cardiac  silhouette is normal..      The osseous structures and surrounding soft tissues demonstrate no acute  abnormality.          Impression:      1. Large left pleural effusion  2. Small caliber right chest tube is present in the right lung is  expanded to the chest wall..     3. Indistinct pulmonary vascular margins most likely representing  pulmonary vascular congestion     This report was finalized on 06/30/2021 07:13 by Dr. Wyatt Carroll MD.    XR Chest 1 View [[id number]] Collected: 06/29/21 0825     Updated: 06/29/21 0829    Narrative:      EXAM: XR CHEST 1 VW- 6/29/2021 8:05 AM CDT     HISTORY: pleural effusion right; R13.10-Dysphagia, unspecified;  Z74.09-Other reduced mobility       COMPARISON: June 28, 2021.     TECHNIQUE: Frontal radiograph of the  chest     FINDINGS:   A small caliber right chest tube is present. The right lung appears  expanded to the chest wall. Mild opacity in the right infrahilar region  is present. This may be atelectasis.     A moderate left pleural fluid collection is present. The left diaphragms  indistinct and there may be atelectasis in the left lower lobe.. Cardiac  silhouette is normal in size. Slightly shifted from right to left  associated with volume loss in the left lower lobe..      The osseous structures and surrounding soft tissues demonstrate no acute  abnormality.          Impression:      1. Small-caliber right chest tube is present with the right lung is  expanded the chest wall.        2. Opacity right infrahilar region. This may be atelectasis or focal  inflammatory process.  3. Left pleural effusion with atelectasis left lower lobe.        This report was finalized on 06/29/2021 08:26 by Dr. Wyatt Carroll MD.    XR Abdomen KUB [[id number]] Collected: 06/29/21 0720     Updated: 06/29/21 0724    Narrative:      XR ABDOMEN KUB- 6/29/2021 3:05 AM CDT     HISTORY: Check dobhoff placement; R13.10-Dysphagia, unspecified;  Z74.09-Other reduced mobility       COMPARISON: None     FINDINGS:  Moderate left pleural effusion is present.. Dobbhoff tube is coiled in  the esophagus..     No acute skeletal abnormality is identified.        Impression:      1. Dobbhoff tube is coiled in the esophagus and directed to the pharynx.  2. Moderate left pleural effusion.         This report was finalized on 06/29/2021 07:21 by Dr. Wyatt Carroll MD.    XR Chest 1 View [[id number]] Collected: 06/28/21 2108     Updated: 06/28/21 2112    Narrative:      EXAMINATION: XR CHEST 1 VW- 6/28/2021 9:08 PM CDT     HISTORY: post chest tube; R13.10-Dysphagia, unspecified; Z74.09-Other  reduced mobility.     REPORT: A frontal view of the chest was obtained.     COMPARISON: Chest x-rays 6/28/2021 0952 hours.     The lungs are hypoaerated as before, there is  consolidation of the left  lung base which is unchanged, with probable atelectasis and effusion,  though pneumonia is not excluded. There is a new smallbore right chest  tube at the right base, this appears to be in satisfactory position. No  pneumothorax is identified. There is mild improvement in aeration of the  right lung base with probable decrease in effusion. Heart size appears  to be normal. There is central vascular congestion and interstitial  edema, with mild improvement. No other change.       Impression:      Interval insertion of a smallbore right basilar chest tube  in good position without pneumothorax. Decrease in right pleural  effusion and improvement in aeration of the right lung is noted. The  left lung is unchanged with left basilar consolidation and there is  persistent central edema.  This report was finalized on 06/28/2021 21:09 by Dr. Hamzah Borja MD.    US Renal Artery Complete [[id number]] Collected: 06/28/21 1359     Updated: 06/28/21 1409    Narrative:      EXAM: US RENAL ARTERY COMPLETE- - 6/28/2021 11:02 AM CDT     HISTORY: Possible embolization from infective endocarditis;  R13.10-Dysphagia, unspecified; Z74.09-Other reduced mobility       COMPARISON: 6/26/2021.      TECHNIQUE: Grayscale and Doppler examination for evaluation of kidneys  and renal arteries was performed. Technologist reports best possible  images obtained due to patient confusion.     FINDINGS:  Limited exam.     Aorta: Peak systolic velocity: 95.3 cm/sec.   No aneurysm     RIGHT KIDNEY:  No hydronephrosis demonstrated.     RIGHT RENAL DOPPLER:  Right renal artery maximum peak systolic velocity: 115.4 cm/sec at  distal aspect, resistive index 0.79.  Right Renal aortic ratio: 1.2     LEFT KIDNEY:  No hydronephrosis demonstrated.      LEFT RENAL DOPPLER:  Left renal artery maximum Peak systolic velocity: 163.8 cm/sec at the  distal aspect, resistive index 0.84  Left Renal aortic ratio: 1.7     There appears to be  venous flow at the bilateral kidneys on static color  images, although the renal veins are not discretely interrogated on this  exam.          Impression:      1. Limited exam.  2. Bilateral renal arteries patent.  This report was finalized on 06/28/2021 14:06 by Dr Rossy Elizabeth MD.    XR Chest 1 View [[id number]] Collected: 06/28/21 1021     Updated: 06/28/21 1027    Narrative:      EXAM: XR CHEST 1 VW- - 6/28/2021 10:01 AM CDT     HISTORY: f/u respiratory distress; R13.10-Dysphagia, unspecified;  Z74.09-Other reduced mobility       COMPARISON: 6/27/2021.      TECHNIQUE:  1 images.  Frontal view of the chest.     FINDINGS:    Similar bibasilar opacities and effusion. Similar perihilar opacities.  No pneumothorax. Cardiac mediastinal silhouette similar to prior. No  acute bony finding.          Impression:      1. Similar perihilar and bibasilar opacities with probable effusions.  This report was finalized on 06/28/2021 10:24 by Dr Rossy Elizabeth MD.    XR Chest 1 View [[id number]] Collected: 06/27/21 1741     Updated: 06/27/21 1747    Narrative:      EXAMINATION: XR CHEST 1 VW- 6/27/2021 5:41 PM CDT     HISTORY: resp distress; R13.10-Dysphagia, unspecified; Z74.09-Other  reduced mobility.     REPORT: A frontal view of the chest was obtained.     COMPARISON: Chest x-ray 6/23/2021.     There is persistent consolidation of the left lung base, increased  infiltrate is seen in the right lung now there appear to be bilateral  pleural effusions and central vascular congestion and pulmonary edema.  The heart margins are mostly obscured. No pneumothorax is identified. No  acute osseous abnormality is identified.       Impression:      Increased volume loss in the lung bases and increase in  volume of the pleural effusions, with central vascular congestion and  pulmonary edema. This is compatible with worsening volume overload.  There is persistent consolidation of the left lung base and pneumonia  cannot be  excluded.  This report was finalized on 06/27/2021 17:44 by Dr. Hamzah Borja MD.    US Renal Bilateral [[id number]] Collected: 06/26/21 0956     Updated: 06/26/21 1004    Narrative:      EXAM: US RENAL BILATERAL-     INDICATION: LUIZ; R13.10-Dysphagia, unspecified; Z74.09-Other reduced  mobility     COMPARISON: None available.     FINDINGS:     RIGHT kidney measures 12 cm in length. LEFT kidney measures 10.9 cm in  length. Both kidneys demonstrate normal cortical thickness and  echogenicity. No shadowing calculi or hydronephrosis. No focal urinary  bladder wall thickening.       Impression:         Negative for hydronephrosis or renal atrophy.  This report was finalized on 06/26/2021 10:01 by Dr. Ry Shin MD.    CT Angiogram Coronary [[id number]] Resulted: 06/25/21 1910     Updated: 06/25/21 1927    Narrative:       Procedure: CT angiography of the coronary arteries with intravenous   contrast including 3D image postprocessing including evaluation of current   cardiac structure and morphology with coronary artery calcium scoring       ______________________________________________________________________  Acquisition mode:  Prospective ECG triggering   Contrast type and volume:  Isovue   Medication used:  Nitroglycerin beta-blocker therapy when indicated   complications: None immediate  Image quality: Satisfactory ,   Some misregistration artifacts noted. This decreases overall accuracy of   the test  Signal to noise.  Satisfactory  Scanner and settings:Siemens force dual source CT scanner using iterative   reconstruction and prospective gating  Preliminary  study was obtained followed by coronary artery calcium   scoring procedure.    Following administration of contrast collimated images were obtained   through the coronary arteries.    Data was transferred offline for 3D reconstruction including curved   multiplanar reformatting and multiplanar imaging.  Indication: Low to moderate suspicion of  coronary artery disease   ALARA follow to minimize radiation  ______________________________________________________________________  Coronary Calcium (Agatston):  0  _____________________________________________________________________    Coronary angiography:     Left Main: The left main is a normal caliber vessel with a normal take off   from the left coronary cusp that   bifurcates to form a left anterior descending artery and a left circumflex   artery.  Left main coronary artery is normal     Left anterior descending artery: Arises normally from the left main   coronary artery and gives off septal and diagonal branches.  Proximal left   anterior descending coronary artery does not have any significant disease.    Left circumflex artery: Arises normally from the left main coronary artery   and gives off obtuse marginal branches.  Proximal left circumflex coronary   artery does not have any significant disease.    Right coronary artery: Arises normally from the right coronary cusp.  It   bifurcates into the right posterior descending coronary artery and the   right posterior lateral arteries.  Right coronary artery is dominant.  Proximal right coronary artery does   not have any significant stenotic disease.    Right atrium: Normal size  No filling defects noted    Right ventricle: Normal size   No filling defects noted.    Left Atrium:  Left atrial size is normal in size  No left atrial appendage filling defect   Left Ventricle: The ventricular cavity size is within normal limits. There   are no stigmata of prior infarction. There is no abnormal filling defect.     Pulmonary arteries: Normal in size without proximal filling defect     Pulmonary veins: Normal pulmonary venous drainage. There were four  Noted   pulmonary veins, two on the right and two on the left.     Pericardium:  Normal thickness with no significant effusion or calcium   present.      Cardiac valves: Mild thickening of aortic valve leaflets  noted.    Aorta: Normal caliber.    Extra-cardiac findings: Minor bony changes of the spine noted     ______________________________________________________________________      Cardiac CT angiography 6/25/2021    Impression:     1. Total calcium score : 0  2.  No significant disease noted of the left main coronary artery,   proximal left anterior descending coronary artery, proximal left   circumflex coronary artery and proximal right coronary artery.  3.  Substantial degradation of images due to motion artifacts and cannot   assess the mid to distal portion of epicardial vessels.  4.  Limited gated scan shows preserved biventricular function with   hypokinesis of the apical free wall of the right ventricle  5.  Suggestion of thickening of aortic valve leaflets.  Unfortunately   coronary CT angiography is suboptimal for visualization of vegetations   from endocarditis.  6.  Consolidation of left lung base noted.  Interstitial edema of the lung   noted.  Left pleural effusion noted.    ___________________________________________________________________________      Recommendations:    Patient currently admitted and undergoing further work-up.  Further evaluation if ongoing chest pain or high risk of suspicion of   significant ischemic heart disease  May consider radiology overread of noncardiac findings        US Abdomen Limited [[id number]] Collected: 06/24/21 1407     Updated: 06/24/21 1413    Narrative:      HISTORY: Right upper quadrant pain     Right upper quadrant ultrasound: Sonographic imaging the right upper  quadrant is performed.     Limited images the pancreas are unremarkable. Visible abdominal aorta is  normal in caliber. Proximal IVC is patent. Coarse echogenic liver  parenchyma may indicate hepatic steatosis. No focal liver lesion  identified. Appropriate directional flow within the portal vein. Minimal  biliary sludge considered with no gallstones. No gallbladder wall  thickening pericholecystic  fluid. Common bile duct is normal at 5 mm.     Right kidney appears normal measuring 11.8 cm in length. No right-sided  hydronephrosis.     Incidental note made of small right pleural effusion.       Impression:      1. Questionable biliary sludge with no discrete gallstones. No biliary  dilatation. No sonographic evidence of acute cholecystitis.  2. Small right pleural effusion.  3. Mild hepatic steatosis.  This report was finalized on 06/24/2021 14:10 by Dr. Melissa Mcneil MD.    XR Chest 1 View [[id number]] Collected: 06/23/21 1639     Updated: 06/23/21 1644    Narrative:      EXAMINATION: XR CHEST 1 VW- 6/23/2021 4:39 PM CDT     HISTORY: dyspnea; R13.10-Dysphagia, unspecified; Z74.09-Other reduced  mobility.     REPORT: A frontal view of the chest was obtained.     COMPARISON: Chest x-ray 6/21/2021.     The lungs are grossly hypoaerated, there are persistent and slightly  increased central and basilar interstitial infiltrates and partial  consolidation of the left lower lobe with a small effusion. The left  heart margin is partially obscured. No cardiomegaly seen however. No  pneumothorax is identified. The osseous structures and upper abdomen are  unremarkable.       Impression:      Pulmonary hypoventilation with persistent changes of  interstitial edema and probable volume overload, with slight interval  increase. There is also consolidation left lung base as before and  underlying pneumonia cannot be excluded. There appears to be a small  persistent left pleural effusion.  This report was finalized on 06/23/2021 16:41 by Dr. Hamzah Borja MD.    MRI Brain Without Contrast [[id number]] Collected: 06/22/21 1647     Updated: 06/22/21 1654    Narrative:      Indication: Right-sided weakness        Technique: Multisequence, multiplanar MRI of the brain without contrast.     Comparison: None     Findings:      Evolving right MCA territory infarct, subacute to chronic in its time  course with facilitated diffusion  and T2 shine through. No acute  ischemia. Developing encephalomalacia within this territory. No  associated hemorrhage or mass effect.     No intra-axial or extra-axial hemorrhage. The ventricles, cortical sulci  and basal cisterns are symmetric and age appropriate. Posterior fossa  structures are unremarkable. Pituitary gland and sella are unremarkable.  The major intracranial flow-voids are preserved. Orbital contents are  unremarkable. The paranasal sinuses are clear. Left mastoid effusion.  Normal bone marrow signal.        Impression:      Impression:     1. No acute ischemia.  2. Subacute-to-chronic right MCA territory superior division infarct  demonstrating facilitated diffusion, T2 shine through and developing  encephalomalacia.  This report was finalized on 06/22/2021 16:51 by Dr Rudy Chen, .    XR Chest 1 View [[id number]] Collected: 06/21/21 2155     Updated: 06/21/21 2200    Narrative:      EXAMINATION: Chest 1 view 6/21/2021     HISTORY: Admission.     FINDINGS: There is left lower lobe consolidation suggesting pneumonia.  There is an element of interstitial lung disease within both lungs with  Kerley B lines within the lower lobes. An element of pulmonary venous  hypertension with interstitial edema is not excluded. There is a  left-sided effusion with blunting of the costophrenic angle.       Impression:      1.. Left lower lobe consolidation with partial silhouetting of the left  diaphragm. A small left effusion is present.  2. Prominent interstitial markings within the lung bases, particularly  on the left. An element of pulmonary venous hypertension and mild  interstitial edema is not excluded.  This report was finalized on 06/21/2021 21:57 by Dr. Alexander Pimentel MD.    XR Abdomen KUB [[id number]] Collected: 06/21/21 2154     Updated: 06/21/21 2158    Narrative:      EXAMINATION: KUB radiograph 6/21/2021     HISTORY: Admission.     FINDINGS: KUB radiograph demonstrates a nonspecific bowel gas  pattern  with gas in both small bowel and colon. There is no obstruction or free  air. There is mild constipation. There is degenerative change of the mid  and lower lumbar spine. The patient is status post bilateral hip  arthroplasties.       Impression:      . Nonspecific bowel gas pattern. There is no obstruction or  free air.  This report was finalized on 06/21/2021 21:55 by Dr. Alexander Pimentel MD.        LAB RESULTS:      Lab 07/01/21 0224 06/30/21 0318 06/28/21 0223 06/27/21 0226 06/26/21  0346   WBC 9.63 11.34* 13.83* 10.40 10.03   HEMOGLOBIN 10.3* 9.7* 9.9* 8.8* 9.4*   HEMATOCRIT 32.9* 30.7* 31.6* 27.8* 30.2*   PLATELETS 570* 568* 530* 512* 558*   NEUTROS ABS  --   --  9.06* 6.67 5.92   IMMATURE GRANS (ABS)  --   --  0.13* 0.04 0.04   LYMPHS ABS  --   --  3.50* 2.77 3.03   MONOS ABS  --   --  1.00* 0.80 0.94*   EOS ABS  --   --  0.00 0.00 0.00   MCV 84.4 83.9 86.1 84.5 84.8         Lab 07/01/21 0224 06/30/21 0318 06/29/21  0330 06/28/21 0223 06/27/21 0226 06/26/21  1404   SODIUM 144 143 142 140 140  --    POTASSIUM 3.3* 3.1* 3.6 4.9 3.3*  --    CHLORIDE 104 104 101 104 104  --    CO2 25.0 24.0 23.0 20.0* 25.0  --    ANION GAP 15.0 15.0 18.0* 16.0* 11.0  --    BUN 30* 27* 23* 19 16  --    CREATININE 4.42* 4.31* 3.93* 3.37* 2.65*  --    GLUCOSE 141* 144* 154* 111* 160*  --    CALCIUM 10.1 10.0 10.5 10.2 10.5  --    MAGNESIUM 2.0 1.9  --   --   --  1.9   PHOSPHORUS 4.0 5.2*  --   --   --  4.8*         Lab 07/01/21  0224 06/27/21  0226   TOTAL PROTEIN 6.4 5.8*   ALBUMIN 2.20* 2.20*   GLOBULIN 4.2 3.6   ALT (SGPT) 10 14   AST (SGOT) 16 18   BILIRUBIN 0.2 0.2   ALK PHOS 316* 414*                     Lab 06/28/21  0625 06/27/21  1540   PH, ARTERIAL 7.304* 7.348*   PCO2, ARTERIAL 50.0* 47.8*   PO2 ART 63.7* 83.8   O2 SATURATION ART 90.2* 96.5   HCO3 ART 24.8 26.2*   BASE EXCESS ART -1.8* 0.3     Brief Urine Lab Results  (Last result in the past 365 days)      Color   Clarity   Blood   Leuk Est   Nitrite    Protein   CREAT   Urine HCG        06/26/21 1707             103.9       06/26/21 1707             103.9           Microbiology Results (last 10 days)     Procedure Component Value - Date/Time    Eosinophil Smear - Urine, Urinary Bladder [[id number]]  (Normal) Collected: 06/29/21 1404    Lab Status: Final result Specimen: Urine from Urinary Bladder Updated: 06/30/21 0437     Eosinophil Smear 0 % EOS/100 Cells     COVID PRE-OP / PRE-PROCEDURE SCREENING ORDER (NO ISOLATION) - Swab, Nasal Cavity [[id number]]  (Normal) Collected: 06/27/21 2021    Lab Status: Final result Specimen: Swab from Nasal Cavity Updated: 06/27/21 2141    Narrative:      The following orders were created for panel order COVID PRE-OP / PRE-PROCEDURE SCREENING ORDER (NO ISOLATION) - Swab, Nasal Cavity.  Procedure                               Abnormality         Status                     ---------                               -----------         ------                     COVID-19,[facility]...[[id number]]  Normal              Final result                 Please view results for these tests on the individual orders.    COVID-19,[facility] IN-HOUSE,Nasal Swab No Transport Media 3-4 HR TAT - Swab, Nasal Cavity [[id number]]  (Normal) Collected: 06/27/21 2021    Lab Status: Final result Specimen: Swab from Nasal Cavity Updated: 06/27/21 2141     COVID19 Not Detected    Narrative:      Fact sheet for providers: https://www.fda.gov/media/103587/download     Fact sheet for patients: https://www.fda.gov/media/930206/download    Test performed by PCR.    Consider negative results in combination with clinical observations, patient history, and epidemiological information.        Hospital Course:  The patient was admitted on 6/21 by Dr. Jovel.  She was excepted as a transfer from [facility] after recently being admitted there.  She was accepted on behalf of Dr. Velasquez with the cardiothoracic surgery service.  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 Tian. Dr. Velasquez reviewed her SUZY images and felt that she thickening of her aortic valve.  There was also some concern that her arterial thrombosis was secondary to her endocarditis.  Dr. Man cared for the patient from 6/22-6/27.     Dr. Velasquez continues to follow.  He and I have discussed the case in detail.  He wanted to repeat an echocardiogram on 6/28 to reevaluate.  Dr. Lawrence feels there is a moderate sized, mobile vegetation on the aortic valve.  Moderate aortic valve regurgitation present.  Moderate mitral valve regurgitation present with an eccentric jet noted giving suspicion to mitral valve endocarditis of the anterior leaflet. She is not a great surgical candidate at present, but he was concerned that this is what she will ultimately require.      Infectious diseases followed.  She had positive blood cultures for Enterococcus at the outlying facility.  Blood cultures here have been negative.  She continues on ampicillin and ceftriaxone at this point in time. 6 weeks of IV antibiotic therapy have been recommended. Groshong was recommended, but never placed.      She has developed problems with acute kidney injury.  She has become volume overloaded.  She has been followed by nephrology recently.  Bilateral renal ultrasound from 6/25 showed no abnormalities.  Renal function declined on a Lasix drip so it was tranisitioned to pushes on 6/29. Stopped on the morning of 6/30 with some low blood pressure. There were high concerns for eventual dialysis needs.  Renal artery duplex examination on 6/28 was limited that showed patent bilateral renal arteries.       She had worsened from a respiratory standpoint on 6/27.  She was moved to the intensive care unit. She required BiPAP for a time.  Dr. Velasquez drained her right pleural effusion on 6/28.  This is allowed her  respiratory status to improve and she remains off BiPAP since this intervention.      Vascular surgery was consulted secondary to her recent right brachial artery thrombectomy.  They are also following for the possibility of the a Groshong catheter to continue antibiotics.  This will be placed on hold at this point.  She may ultimately need a permacath for dialysis.     He has a history of type 1 diabetes that is insulin-dependent.  She is a recent hemoglobin A1c of 6.9. Recent blood glucoses are fairly well controlled.  Continue Accu-Cheks and sliding scale insulin for now.       Speech has seen several times and has cleared her for pureed foods with nectar thick liquids. Dr. Velasquez may want us to place a Dobbhoff tube for nutrition.     She has been on prophylactic Lovenox.  She has previously been on a heparin drip.     Her situation continued to be exceedingly complicated. Palliative care consulted on 6/29 with Dr. Velasquez agreeable with this. The patient is unable to make any decisions for herself at present. Her mother is her surrogate decision-maker/POA. Family meeting was done at 1 pm on 7/1. The patient's father, stepfather, mother, sister, and her son were present and agreeable to the discussion with myself, Tamie Walton, CHRISTINE Victor, and Dr. Drew Velasquez with cardiothoracic surgery. Content discussed was her recurrent state, future state, expected outcomes, the possibility of dialysis, the possibility of prolonged antibiotics versus eventual surgical intervention.  There was much discussion between the care team and the patient's family.  They feel that the patient's baseline at present is poor to begin with and that she would not have an appropriate outcome no matter what direction we took her Natalya.  Dr. Velasquez talked about more immediate surgery versus prolonged antibiotic therapy and waiting on eventual surgery.  We talked about her upcoming need for dialysis in all likelihood.  We talked about  "complications that could arise secondary to her poor nutritional status and difficulty feeding.  They would not want feeding tubes. They wanted her chest tube removed.  They do not want to do anything aggressive any longer in her care.  They wish for her to return to [facility] for hospice care.  The patient's mother states that she had much difficulty caring for her after her stroke in the home setting and could not possibly imagine being able to take care of her in her current state.  [facility] will take her today.     Physical Exam on Discharge:  /64 (BP Location: Left arm, Patient Position: Lying)   Pulse 82   Temp 98 °F (36.7 °C) (Axillary)   Resp 16   Ht 165.1 cm (65\")   Wt 76.9 kg (169 lb 8 oz)   LMP  (LMP Unknown)   SpO2 93%   Breastfeeding No   BMI 28.21 kg/m²   Physical Exam  Constitutional:       Appearance: She is well-developed.      Comments: Up in bed. Somnolent. No family present. Discussed with her nurse, Galina. She spoke with her mother this morning.   HENT:      Head: Normocephalic and atraumatic.   Eyes:      Conjunctiva/sclera: Conjunctivae normal.      Pupils: Pupils are equal, round, and reactive to light.   Neck:      Vascular: No JVD.   Cardiovascular:      Rate and Rhythm: Normal rate and regular rhythm.      Heart sounds: Murmur heard.   No friction rub. No gallop.    Pulmonary:      Breath sounds: No wheezing or rales.      Comments: On 1L.  Diminished breath sounds at the bases. Right sided thoracostomy tube removed today by CTS.   Abdominal:      General: Bowel sounds are normal. There is no distension.      Palpations: Abdomen is soft.      Tenderness: There is no abdominal tenderness. There is no guarding or rebound.   Musculoskeletal:         General: Swelling present. No tenderness or deformity. Normal range of motion.      Cervical back: Neck supple.      Comments: Right upper extremity has staples in place from recent brachial thrombectomy.   Skin:     General: " Skin is warm and dry.      Findings: No rash.   Neurological:      Mental Status: She is alert.      Cranial Nerves: No cranial nerve deficit.      Motor: Weakness (L>R) present. No abnormal muscle tone.      Deep Tendon Reflexes: Reflexes normal.   Psychiatric:      Comments: Somnolent.       Condition on Discharge: Poor prognosis and returning to the SNF on comfort measures.     Discharge Disposition:  Skilled Nursing Facility ([facility] - External): [facility].     Discharge Medications:     Discharge Medications      New Medications      Instructions Start Date   Glycerin-Hypromellose- 0.2-0.2-1 % solution ophthalmic solution  Commonly known as: ARTIFICIAL TEARS   1 drop, Both Eyes, Every 30 Minutes PRN      LORazepam 2 MG/ML concentrated solution  Commonly known as: ATIVAN   0.5 mg, Oral, Every 6 Hours PRN      morphine 20 MG/ML concentrated solution   5 mg, Oral, Every 3 Hours PRN      Scopolamine 1.5 MG/3DAYS patch  Commonly known as: TRANSDERM-SCOP   1 patch, Transdermal, Every 72 Hours PRN         Continue These Medications      Instructions Start Date   acetaminophen 650 MG suppository  Commonly known as: TYLENOL   650 mg, Rectal, Every 4 Hours PRN      acetaminophen 325 MG tablet  Commonly known as: TYLENOL   650 mg, Oral, Every 4 Hours PRN      lamoTRIgine 100 MG tablet  Commonly known as: LaMICtal   100 mg, Oral, Daily      QUEtiapine  MG 24 hr tablet  Commonly known as: SEROquel XR   200 mg, Oral, Nightly         Stop These Medications    Admelog 100 UNIT/ML injection  Generic drug: insulin lispro     aspirin 81 MG EC tablet     clopidogrel 75 MG tablet  Commonly known as: PLAVIX     gabapentin 100 MG capsule  Commonly known as: NEURONTIN     lisinopril 10 MG tablet  Commonly known as: PRINIVIL,ZESTRIL     OLANZapine 5 MG tablet  Commonly known as: zyPREXA     ondansetron 8 MG tablet  Commonly known as: ZOFRAN     Semglee 100 UNIT/ML injection  Generic drug: insulin glargine     traMADol 50 MG  tablet  Commonly known as: ULTRAM          Discharge Diet:   Diet Instructions     Advance Diet As Tolerated          Activity at Discharge:   Activity Instructions     Up WIth Assist          Follow-up Appointments:   None anticipated at present.     Test Results Pending at Discharge: None.     Electronically signed by Shane Baird DO, 07/02/21, 10:46 CDT.    Time: 40 minutes.           Electronically signed by Shane Baird DO at 07/02/21 1054